# Patient Record
Sex: MALE | Race: WHITE | NOT HISPANIC OR LATINO | Employment: OTHER | URBAN - METROPOLITAN AREA
[De-identification: names, ages, dates, MRNs, and addresses within clinical notes are randomized per-mention and may not be internally consistent; named-entity substitution may affect disease eponyms.]

---

## 2017-11-02 ENCOUNTER — TRANSCRIBE ORDERS (OUTPATIENT)
Dept: URGENT CARE | Facility: CLINIC | Age: 77
End: 2017-11-02

## 2017-11-02 ENCOUNTER — APPOINTMENT (OUTPATIENT)
Dept: RADIOLOGY | Facility: CLINIC | Age: 77
End: 2017-11-02
Payer: MEDICARE

## 2017-11-02 ENCOUNTER — OFFICE VISIT (OUTPATIENT)
Dept: URGENT CARE | Facility: CLINIC | Age: 77
End: 2017-11-02
Payer: MEDICARE

## 2017-11-02 DIAGNOSIS — M54.50 LOW BACK PAIN: ICD-10-CM

## 2017-11-02 PROCEDURE — 72110 X-RAY EXAM L-2 SPINE 4/>VWS: CPT

## 2017-11-02 PROCEDURE — G0463 HOSPITAL OUTPT CLINIC VISIT: HCPCS

## 2017-11-02 PROCEDURE — 99203 OFFICE O/P NEW LOW 30 MIN: CPT

## 2017-11-02 PROCEDURE — 73522 X-RAY EXAM HIPS BI 3-4 VIEWS: CPT

## 2017-11-04 NOTE — PROGRESS NOTES
Assessment  1  Low back pain (724 2) (M54 5)   2  Lumbar vertebral fracture (805 4) (S32 009A)    Plan  Low back pain    · * XR SPINE LUMBAR MINIMUM 4 VIEWS NON INJURY; Status:Complete - Retrospective  Authorization;   Done: [id number] 05:49PM   · XR HIPS BILATERAL 3-4 VS W PELVIS IF PERFORMED; Status:Complete - Retrospective  Authorization;   Done: [id number] 05:49PM    Discussion/Summary  Discussion Summary:   Follow up with orthopedistoxycodone 5mg for painice 10-20 minutes at a time at least 3 times a dayup with cardiologist regarding AAA  Chief Complaint  1  Pain  Chief Complaint Free Text Note Form: Pain in pelvic area for months      History of Present Illness  HPI: 77yo M p/w pain in lower back x several months  Pt suffered stress fractures to anterior pelvis in August and is followed by orthopedic surgeon  Pt was given injection by [facility] Interventional Radiologist Dr Araceli Rivera of [place] and methylprednisolone into right SI joint on Monday  Pt is complaining of worsening pain since this injection  Pt does not have follow up scheduled with radiologist or orthopedist  I spoke with Radiologist on the phone and radiologist sent pt here for repeat xrays of pelvis and lower back to r/o repeat fractures  Hospital Based Practices Required Assessment:   Pain Assessment   the patient states they have pain  The pain is located in the pelvis  The patient describes the pain as aching  (on a scale of 0 to 10, the patient rates the pain at 8 )       Review of Systems  Focused-Male:   Constitutional: no fever or chills, feels well, no tiredness, no recent weight loss or weight gain  ENT: no complaints of earache, no loss of hearing, no nosebleeds or nasal discharge, no sore throat or hoarseness  Cardiovascular: no complaints of slow or fast heart rate, no chest pain, no palpitations, no leg claudication or lower extremity edema     Respiratory: no complaints of shortness of breath, no wheezing or cough, no dyspnea on exertion, no orthopnea or PND  Gastrointestinal: no complaints of abdominal pain, no constipation, no nausea or vomiting, no diarrhea or bloody stools  Genitourinary: no complaints of dysuria or incontinence, no hesitancy, no nocturia, no genital lesion, no inadequacy of penile erection  Musculoskeletal: arthralgias-- and-- myalgias, but-- no joint swelling,-- no limb pain,-- no joint stiffness-- and-- no limb swelling  Integumentary: no complaints of skin rash or lesion, no itching or dry skin, no skin wounds  Neurological: no complaints of headache, no confusion, no numbness or tingling, no dizziness or fainting  ROS Reviewed:   ROS reviewed  Past Medical History  Active Problems And Past Medical History Reviewed: The active problems and past medical history were reviewed and updated today  Family History  Family History Reviewed: The family history was reviewed and updated today  Social History  Social History Reviewed: The social history was reviewed and is unchanged  Surgical History  Surgical History Reviewed: The surgical history was reviewed and updated today  Current Meds  Medication List Reviewed: The medication list was reviewed and updated today  Vitals  Signs   Recorded: [id number] 05:37PM   Temperature: 98 6 F  Heart Rate: 88  Respiration: 20  Systolic: 872  Diastolic: 75  Height: 5 ft 10 in  Weight: 275 lb   BMI Calculated: 39 46  BSA Calculated: 2 39  O2 Saturation: 96    Physical Exam    Constitutional   General appearance: No acute distress, well appearing and well nourished  Pulmonary   Respiratory effort: No increased work of breathing or signs of respiratory distress  Auscultation of lungs: Clear to auscultation  Cardiovascular   Palpation of heart: Normal PMI, no thrills  Auscultation of heart: Normal rate and rhythm, normal S1 and S2, without murmurs      Examination of extremities for edema and/or varicosities: Normal  Abdomen   Abdomen: Non-tender, no masses  Liver and spleen: No hepatomegaly or splenomegaly  Lymphatic   Palpation of lymph nodes in neck: No lymphadenopathy  Musculoskeletal   Gait and station: Normal     Digits and nails: Normal without clubbing or cyanosis  Inspection/palpation of joints, bones, and muscles: Abnormal  -- TTP over R SI joint and lower lumbar spine  Skin   Skin and subcutaneous tissue: Normal without rashes or lesions  Neurologic   Cranial nerves: Cranial nerves 2-12 intact  Reflexes: 2+ and symmetric  Sensation: No sensory loss  Psychiatric   Orientation to person, place and time: Normal     Mood and affect: Normal        Results/Data  * XR SPINE LUMBAR MINIMUM 4 VIEWS NON INJURY [id number] 05:49PM [facility] Order Number: [id number]     Test Name Result Flag Reference   XR SPINE LUMBAR MINIMUM 4 VIEWS (Report)     LUMBAR SPINE     INDICATION: M54 5: Low back pain  History taken directly from the electronic ordering system  COMPARISON: None     VIEWS: AP, lateral, bilateral oblique and coned down projections     IMAGES: 5     FINDINGS:     Alignment is unremarkable  Mild to moderate anterior wedging is noted of the L1 vertebral body of indeterminate age  There is evidence of prior kyphoplasty to the L2 vertebral body where there is mild vertebral body height loss  Vertebral body height is otherwise maintained  The bones are demineralized  There is moderate disc space narrowing at L1-L2 and L5-S1  There is otherwise mild disc space narrowing in the lumbar spine with scattered mild to moderate marginal osteophyte formation  Dense aortic wall calcification is noted  Aortic wall calcification is distended distally suggestive of underlying abdominal aortic aneurysm measuring up to 4 5 cm in diameter  IMPRESSION:     1  Mild to moderate anterior wedging is noted of the L1 vertebral body of indeterminate age   This could be further evaluated with MRI  There is evidence of prior kyphoplasty to the L2 vertebral body where there is mild vertebral body height loss  2  Evidence for an underlying abdominal aortic aneurysm measuring up to 4 5 cm in diameter  Recommend follow-up with CT or ultrasound  ##imslh##imslh            Workstation performed: [id number]     Signed by:   Boy Singleton MD   11/2/17     XR HIPS BILATERAL 3-4 VS W PELVIS IF PERFORMED [id number] 05:49PM [facility] Order Number: [id number]     Test Name Result Flag Reference   XR HIPS BILATERAL WITH AP PELVIS 3-4 VW (Report)     BILATERAL HIPS AND PELVIS     INDICATION: Pain  COMPARISON: None     VIEWS: AP pelvis and coned down views of each hip     IMAGES: 5      FINDINGS:     No acute fracture  Subcortical cyst noted at the right ischial tuberosity may be degenerative  Visualized bony pelvis appears intact  LEFT HIP:   Moderate narrowing noted of the hip joint  Bony alignment is maintained  Soft tissues are unremarkable  RIGHT HIP:   Moderate narrowing noted of the hip joint  Bony alignment is maintained  Soft tissues are unremarkable  IMPRESSION:     No acute fracture         Workstation performed: [id number]     Signed by:   Boy Singleton MD   11/2/17       Signatures   Electronically signed by : HADLEY Collins; Nov  3 2017  9:20AM EST                       (Author)    Electronically signed by : HADLEY Collnis; Nov  3 2017  9:21AM EST                       (Author)    Electronically signed by : NETTE Basurto ; Nov  3 2017 10:53AM EST                       (Co-author)

## 2020-08-31 ENCOUNTER — CONSULT (OUTPATIENT)
Dept: PAIN MEDICINE | Facility: CLINIC | Age: 80
End: 2020-08-31
Payer: MEDICARE

## 2020-08-31 VITALS
HEIGHT: 71 IN | DIASTOLIC BLOOD PRESSURE: 76 MMHG | SYSTOLIC BLOOD PRESSURE: 128 MMHG | WEIGHT: 250 LBS | TEMPERATURE: 96.9 F | HEART RATE: 75 BPM | BODY MASS INDEX: 35 KG/M2

## 2020-08-31 DIAGNOSIS — M16.0 BILATERAL PRIMARY OSTEOARTHRITIS OF HIP: ICD-10-CM

## 2020-08-31 DIAGNOSIS — M54.16 LUMBAR RADICULOPATHY: Primary | ICD-10-CM

## 2020-08-31 PROCEDURE — 99204 OFFICE O/P NEW MOD 45 MIN: CPT | Performed by: ANESTHESIOLOGY

## 2020-08-31 RX ORDER — CHOLECALCIFEROL (VITAMIN D3) 25 MCG
CAPSULE ORAL
COMMUNITY

## 2020-08-31 RX ORDER — MONTELUKAST SODIUM 10 MG/1
10 TABLET ORAL DAILY
COMMUNITY
Start: 2020-07-03

## 2020-08-31 RX ORDER — SITAGLIPTIN 100 MG/1
TABLET, FILM COATED ORAL
COMMUNITY
Start: 2020-08-10

## 2020-08-31 RX ORDER — ATORVASTATIN CALCIUM 20 MG/1
TABLET, FILM COATED ORAL
COMMUNITY
Start: 2020-06-18

## 2020-08-31 RX ORDER — TAMSULOSIN HCL 0.4 MG
CAPSULE ORAL
COMMUNITY
Start: 2020-06-18

## 2020-08-31 RX ORDER — UMECLIDINIUM BROMIDE AND VILANTEROL TRIFENATATE 62.5; 25 UG/1; UG/1
POWDER RESPIRATORY (INHALATION)
COMMUNITY
Start: 2020-07-05

## 2020-08-31 RX ORDER — ASPIRIN 81 MG/1
TABLET ORAL
COMMUNITY

## 2020-08-31 RX ORDER — OXYBUTYNIN CHLORIDE 5 MG/1
TABLET ORAL
COMMUNITY
Start: 2020-08-10

## 2020-08-31 RX ORDER — ALBUTEROL SULFATE 90 UG/1
2 AEROSOL, METERED RESPIRATORY (INHALATION) EVERY 6 HOURS PRN
COMMUNITY

## 2020-08-31 RX ORDER — VITAMIN B COMPLEX
TABLET ORAL
COMMUNITY

## 2020-08-31 RX ORDER — LEVOTHYROXINE SODIUM 175 MCG
TABLET ORAL
COMMUNITY
Start: 2020-06-18

## 2020-08-31 RX ORDER — LANCETS 28 GAUGE
EACH MISCELLANEOUS
COMMUNITY

## 2020-08-31 RX ORDER — POLYETHYLENE GLYCOL 3350
GRANULES (GRAM) MISCELLANEOUS
COMMUNITY

## 2020-08-31 RX ORDER — CARVEDILOL 3.12 MG/1
TABLET ORAL
COMMUNITY
Start: 2019-12-19 | End: 2020-12-18

## 2020-08-31 RX ORDER — GLIMEPIRIDE 1 MG/1
TABLET ORAL
COMMUNITY
Start: 2020-08-10

## 2020-08-31 RX ORDER — GABAPENTIN 100 MG/1
100 CAPSULE ORAL 3 TIMES DAILY
Qty: 90 CAPSULE | Refills: 0 | Status: SHIPPED | OUTPATIENT
Start: 2020-08-31

## 2020-08-31 NOTE — PATIENT INSTRUCTIONS
Lumbar Radiculopathy   WHAT YOU NEED TO KNOW:   What is lumbar radiculopathy? Lumbar radiculopathy is a painful condition that happens when a nerve in your lumbar spine (lower back) is pinched or irritated  Nerves control feeling and movement in your body  What causes lumbar radiculopathy? You may get a pinched nerve in your lumbar spine if you have disc damage  Discs are natural, spongy cushions between your vertebrae (back bones) that allow your spine to move  Your discs may move out of place and pinch the nerve in your spine  Your risk for a pinched nerve and lumbar radiculopathy increases if:  · You smoke  · You have diabetes, a spinal infection, or a growth in your spine  · You are overweight  · You are male  · You are elderly  What are the signs and symptoms of lumbar radiculopathy? You may have any of the following:  · Pain that moves from your lower back to your buttocks, groin, and the back of your leg  The pain is often felt below your knee  Your pain may worsen when you cough, sneeze, stand, or sit  · Numbness, weakness, or tingling in your back or legs  How is lumbar radiculopathy diagnosed? Your healthcare provider will examine you and ask about your family history of back and leg pain  He may also test you for weakness, numbness, or tingling in your back, buttocks, and legs  Your healthcare provider may ask you to lie on your back and lift your leg to locate your pain  You may have any of the following:  · Blood tests: You may need blood taken to give caregivers information about how your body is working  The blood may be taken from your hand, arm, or IV  · Magnetic resonance imaging (MRI): An MRI machine is used to take a picture of your lower back  Your healthcare provider will use this picture to check for problems and changes in your back bones, nerves, and discs  You will need to lie still during this test  The MRI machine contains a very powerful magnet    Never enter the MRI room with any metal objects  This can cause serious injury  Tell your healthcare provider if you have any metal implants in your body  · X-ray: An x-ray is a picture of your lower back  A lumbar x-ray may show signs of infection or other problems with your spine  · An electromyography (EMG)  test measures the electrical activity of your muscles at rest and with movement  · Computed tomography (CT) scan: A special x-ray machine uses a computer to take pictures of your lower back  It may be used to look at your bones, discs, and nerves  You may be given dye in your IV to help improve the pictures  Tell your healthcare provider if you are allergic to shellfish, or have other allergies or medical conditions  People who are allergic to iodine or shellfish (lobster, crab, or shrimp) may be allergic to some dyes  How is lumbar radiculopathy treated? Treatment of lumbar radiculopathy may reduce pain and swelling, and improve your ability to walk and do your normal activities  Ask your healthcare provider for more information about these and other treatments for lumbar radiculopathy:  · Medicines:     ¨ NSAIDs , such as ibuprofen, help decrease swelling, pain, and fever  This medicine is available with or without a doctor's order  NSAIDs can cause stomach bleeding or kidney problems in certain people  If you take blood thinner medicine, always ask your healthcare provider if NSAIDs are safe for you  Always read the medicine label and follow directions  ¨ Muscle relaxers  help decrease pain and muscle spasms  ¨ Opioids: This is a strong medicine given to reduce severe pain  It is also called narcotic pain medicine  Take this medicine exactly as directed by your healthcare provider  ¨ Oral steroids: Steroids may be given to reduce swelling and pain  ¨ Steroid injections: Healthcare providers may give you steroid medicine through a needle (shot) into your lumbar spine   This may help decrease your nerve pain and swelling  You may need more than 1 injection if your symptoms do not improve after the first treatment  · Physical therapy: Your healthcare provider may suggest physical therapy  Your physical therapist may teach you certain exercises to improve posture (the way you stand and sit), flexibility, and strength in your lower back  He may also teach you how to remain safely active and avoid further injury  Follow the exercise plan given to you by your physical therapist     · Transcutaneous electrical nerve stimulation: This treatment, called TENS, stimulates your nerves and may decrease your pain  Wires are attached to pads  The pads are attached to your skin  The wires send a mild current through your nerves  · Surgery: You may need surgery to relieve your pinched nerve if your condition has not improved within 4 to 6 weeks  You may also need it if you have lumbar radiculopathy more than once  What are the risks of treatment for lumbar radiculopathy? · Without treatment, your pain may worsen  The pinched and swollen nerve may lead to problems when you walk or move  In severe cases, you may lose control of your urine or bowel movements  Bedrest can make your symptoms worse  · Pain medicines can cause vomiting, upset stomach, constipation (large, hard bowel movements that are difficult to pass), or kidney or liver problems  Opioid medicine may be addictive (hard to quit taking once you start)  Oral steroids and steroid injections may have side effects, such as facial redness, fluid retention (water weight gain), and mood changes  Steroid injections may be painful and can cause severe headaches, infections, allergic reactions, or nerve damage  Surgery risks include delayed problems with healing, spinal or bladder infection, damage to the spinal cord or other nerves, and ongoing pain  In rare cases, you could become paralyzed (not able to move your arms or legs)    How can I care for myself when I have lumbar radiculopathy? · Stay active: It is best to be active when you have lumbar radiculopathy  Your healthcare provider may tell you to take walks to ease yourself back into your daily routine  Avoid long periods of bed rest  Bed rest could worsen your symptoms  Do not move in ways that increase your pain  Ask your healthcare provider for more information about the best ways to stay active  · Use ice or heat packs:  Use ice or heat packs on the sore area of your body to decrease the pain and swelling  Put ice in a plastic bag covered with a towel on your low back  Cover heated items with a towel to avoid burns  Use ice and heat as directed  · Avoid heavy lifting: Your condition may worsen if you lift heavy things  Avoid lifting if possible  · Maintain a healthy weight:  Excess body weight may strain your back  Talk with your healthcare provider about ways to lose excess weight if you are overweight  When should I contact my healthcare provider? · Your pain does not improve within 1 to 3 weeks after treatment  · Your pain and weakness keep you from your normal activities at work, home, or school  · You lose more than 10 pounds in 6 months without trying  · You become depressed or sad because of the pain  · You have questions or concerns about your condition or care  When should I seek immediate care or call 911? · You have a fever greater than 100 4°F for longer than 2 days  · You have new, severe back or leg pain, or your pain spreads to both legs  · You have any new signs of numbness or weakness, especially in your lower back, legs, arms, or genital area  · You have new trouble controlling your urine and bowel movements  · You do not feel like your bladder empties when you urinate  CARE AGREEMENT:   You have the right to help plan your care  Learn about your health condition and how it may be treated   Discuss treatment options with your caregivers to decide what care you want to receive  You always have the right to refuse treatment  The above information is an  only  It is not intended as medical advice for individual conditions or treatments  Talk to your doctor, nurse or pharmacist before following any medical regimen to see if it is safe and effective for you  © 2017 [employer] Information is for End User's use only and may not be sold, redistributed or otherwise used for commercial purposes  All illustrations and images included in CareNotes® are the copyrighted property of [employer]  or [employer]

## 2020-08-31 NOTE — PROGRESS NOTES
Assessment  1  Lumbar radiculopathy  -     MRI lumbar spine wo contrast; Future; Expected date: 08/31/2020  -     Ambulatory referral to Physical Therapy; Future  -     gabapentin (NEURONTIN) 100 mg capsule; Take 1 capsule (100 mg total) by mouth 3 (three) times a day    2  Bilateral primary osteoarthritis of hip  -     Ambulatory referral to Physical Therapy; Future    Symptomatology of neurogenic claudication  Chronic arthritic like low back pain with radicular features in L5 and S1 dermatomal distribution bilaterally  Pain has been progressive accompanied by weakness numbness and paresthesias  X-ray lumbar spine shows degenerative disc disease along with facet arthropathy at lower lumbar levels  Patient also has pain with lumbar facet loading as well  The pain is severely debilitating as he can hardly walk 30-40 feet before having the extreme pain  TTP over trochanteric bursa R>L and pain with internal and external rotation of hips b/l  Plan    -MRI lumbar spine noncontrast  -gabapentin 100 mg t i d  prn neuropathic pain  -PT referral for lumbar radiculopathy  [place] Prescription Drug Monitoring Program report was reviewed and was appropriate     Complete risks and benefits including bleeding, infection, tissue reaction, nerve injury and allergic reaction were discussed  The approach was demonstrated using models and literature was provided  Verbal consent was obtained  My impressions and treatment recommendations were discussed in detail with the patient who verbalized understanding and had no further questions  Discharge instructions were provided  I personally saw and examined the patient and I agree with the above discussed plan of care      New Medications Ordered This Visit   Medications    aspirin (Aspirin Adult Low Dose) 81 mg EC tablet     Sig: Take by mouth    atorvastatin (LIPITOR) 20 mg tablet    carvedilol (COREG) 3 125 mg tablet     Sig: TAKE ONE TABLET BY MOUTH TWICE A DAY WITH MORNING AND EVENING MEALS    Coenzyme Q10 (CoQ10) 100 MG CAPS     Sig: Take by mouth    denosumab (PROLIA) 60 mg/mL     Sig: Inject under the skin    glucose blood test strip     Sig: FreeStyle Lite Strips    Lancets (freestyle) lancets     Sig: FreeStyle Lancets 28 gauge    glimepiride (AMARYL) 1 mg tablet    Synthroid 175 MCG tablet    montelukast (SINGULAIR) 10 mg tablet     Sig: Take 10 mg by mouth daily    oxybutynin (DITROPAN) 5 mg tablet    Calcium Carbonate-Vitamin D (Calcium 600+D) 600-400 MG-UNIT per tablet    Cholecalciferol (Vitamin D-3) 25 MCG (1000 UT) CAPS     Sig: Take by mouth    sertraline (ZOLOFT) 50 mg tablet    Januvia 100 MG tablet    Flomax 0 4 MG    Anoro Ellipta 62 5-25 MCG/INH inhaler    Polyethylene Glycol 3350 GRAN     Sig: by Does not apply route    albuterol (ProAir HFA) 90 mcg/act inhaler     Sig: Inhale 2 puffs every 6 (six) hours as needed for wheezing     Substitution to a formulary equivalent within the same pharmaceutical class is authorized   L-Methylfolate-B6-B12 (FOLTX PO)     Sig: Take by mouth    gabapentin (NEURONTIN) 100 mg capsule     Sig: Take 1 capsule (100 mg total) by mouth 3 (three) times a day     Dispense:  90 capsule     Refill:  0       History of Present Illness    Pratik Rudolph is a 68 y o  male with pmhx of ESRD and DM-2, obesity presenting with chronic lumbar radicular pain in an L5 and S1 dermatomal distributions bilaterally  The patient rates the pain at a 8/10 accompanied by electric shock-like shooting features and crampy burning pain in the aforementioned dermatomal distributions  The pain is worse in the mornings as well as the end of the day; exertion such as walking for long periods of time seems to exacerbate the pain  The patient cannot hardly walk more than a few blocks without having debilitating pain  He tries to maintain an active lifestyle and finds that the current degree of pain seems to compromises this   He has arthritic features of the pain too described as achy, nagging, indolent, stabbing and crampy  The pain significantly impacts independent activities of daily living and contributes to significant disability  He has attempted PT in the past with exacerbation of the pain  He has not tried gabapentin, but nsaids are a relative contraindication secondary to renal insufficiency  He has tried epidural steroid injections in the past with benefit for about a month  He denies any bowel or bladder dysfunction/incontinence    I have personally reviewed and/or updated the patient's past medical history, past surgical history, family history, social history, current medications, allergies, and vital signs today  Review of Systems   Constitutional: Positive for activity change  HENT: Negative  Eyes: Negative  Respiratory: Negative  Cardiovascular: Negative  Gastrointestinal: Negative  Endocrine: Negative  Genitourinary: Negative  Musculoskeletal: Positive for arthralgias, back pain, gait problem and myalgias  Skin: Negative  Allergic/Immunologic: Negative  Neurological: Positive for weakness and numbness  Hematological: Negative  Psychiatric/Behavioral: Negative  All other systems reviewed and are negative  There is no problem list on file for this patient        Past Medical History:   Diagnosis Date    [facility]     COPD [facility]chronic obstructive pulmonary disease) [facility]     Diabetes (HCC)     Dyslipidemia     MARY (generalized anxiety disorder)     Osteoporosis        Past Surgical History:   Procedure Laterality Date    BACK SURGERY  07/2009    FRACTURE SURGERY      stress fracture of pelvis    LEG SURGERY         Family History   Problem Relation Age of Onset    Diabetes Mother     Osteoporosis Mother     Heart disease Father        Social History     Occupational History    Not on file   Tobacco Use    Smoking status: Never Smoker    Smokeless tobacco: Never Used   Substance and Sexual Activity    Alcohol use: Yes     Comment: rarely    Drug use: Never    Sexual activity: Not on file       Current Outpatient Medications on File Prior to Visit   Medication Sig    albuterol (ProAir HFA) 90 mcg/act inhaler Inhale 2 puffs every 6 (six) hours as needed for wheezing    Anoro Ellipta 62 5-25 MCG/INH inhaler     aspirin (Aspirin Adult Low Dose) 81 mg EC tablet Take by mouth    atorvastatin (LIPITOR) 20 mg tablet     Calcium Carbonate-Vitamin D (Calcium 600+D) 600-400 MG-UNIT per tablet     carvedilol (COREG) 3 125 mg tablet TAKE ONE TABLET BY MOUTH TWICE A DAY WITH MORNING AND EVENING MEALS    Cholecalciferol (Vitamin D-3) 25 MCG (1000 UT) CAPS Take by mouth    Coenzyme Q10 (CoQ10) 100 MG CAPS Take by mouth    denosumab (PROLIA) 60 mg/mL Inject under the skin    Flomax 0 4 MG     glimepiride (AMARYL) 1 mg tablet     glucose blood test strip FreeStyle Lite Strips    Januvia 100 MG tablet     L-Methylfolate-B6-B12 (FOLTX PO) Take by mouth    Lancets (freestyle) lancets FreeStyle Lancets 28 gauge    montelukast (SINGULAIR) 10 mg tablet Take 10 mg by mouth daily    oxybutynin (DITROPAN) 5 mg tablet     Polyethylene Glycol 3350 GRAN by Does not apply route    sertraline (ZOLOFT) 50 mg tablet     Synthroid 175 MCG tablet      No current facility-administered medications on file prior to visit  Allergies   Allergen Reactions    Benazepril      cough  cough      Ezetimibe-Simvastatin Myalgia      cramps   cramps      Metformin       diarrhea   diarrhea      Metoprolol Headache     headache  headache      Nsaids      Low GFR    Tolmetin      Other reaction(s): Other (See Comments)  Low GFR    Zolpidem      Other reaction(s):  Other (See Comments)  hallucinating  hallucinating      Mirtazapine Anxiety     Anxious, confusion  Anxious, confusion      Oxycodone Anxiety     agitation         Physical Exam    /76 (BP Location: Left arm, Patient Position: Sitting, Cuff Size: Adult)   Pulse 75   Temp (!) 96 9 °F (36 1 °C) (Temporal)   Ht 5' 11" (1 803 m)   Wt 113 kg (250 lb)   BMI 34 87 kg/m²     Constitutional: normal, well developed, well nourished, alert, in no distress and non-toxic and no overt pain behavior  and obese  Eyes: anicteric  HEENT: grossly intact  Neck: supple, symmetric, trachea midline and no masses   Pulmonary:even and unlabored  Cardiovascular:No edema or pitting edema present  Skin:Normal without rashes or lesions and well hydrated  Psychiatric:Mood and affect appropriate  Neurologic:Cranial Nerves II-XII grossly intact Sensation grossly intact; no clonus negative espinosa's  Reflexes 2+ and brisk  SLR positive left>right sided  Musculoskeletal:normal gait  4/5 strength with left hip flexion and left ankle dorsiflexion  Normal heel toe and tip toe walking  Notable pain with lumbar facet loading bilaterally and with lateral spine rotation  ttp over lumbar paraspinal muscles  Negative brionna's test, negative gaenslen's negative SIJ loading bilaterally  ttp over right trochanteric bursa and internal and external rotation of right hip  Imaging    BILATERAL HIPS AND PELVIS     INDICATION:  Pain      COMPARISON:  None     VIEWS: AP pelvis and coned down views of each hip     IMAGES:  5     FINDINGS:     No acute fracture  Subcortical cyst noted at the right ischial tuberosity may be degenerative  Visualized bony pelvis appears intact      LEFT HIP:  Moderate narrowing noted of the hip joint  Bony alignment is maintained  Soft tissues are unremarkable      RIGHT HIP:  Moderate narrowing noted of the hip joint  Bony alignment is maintained  Soft tissues are unremarkable      IMPRESSION:     No acute fracture  LUMBAR SPINE     INDICATION:  M54 5: Low back pain   History taken directly from the electronic ordering system           COMPARISON: None     VIEWS:  AP, lateral, bilateral oblique and coned down projections     IMAGES:  5     FINDINGS:     Alignment is unremarkable      Mild to moderate anterior wedging is noted of the L1 vertebral body of indeterminate age  There is evidence of prior kyphoplasty to the L2 vertebral body where there is mild vertebral body height loss  Vertebral body height is otherwise maintained  The bones are demineralized      There is moderate disc space narrowing at L1-L2 and L5-S1  There is otherwise mild disc space narrowing in the lumbar spine with scattered mild to moderate marginal osteophyte formation      Dense aortic wall calcification is noted  Aortic wall calcification is distended distally suggestive of underlying abdominal aortic aneurysm measuring up to 4 5 cm in diameter      IMPRESSION:     1  Mild to moderate anterior wedging is noted of the L1 vertebral body of indeterminate age  This could be further evaluated with MRI  There is evidence of prior kyphoplasty to the L2 vertebral body where there is mild vertebral body height loss  2   Evidence for an underlying abdominal aortic aneurysm measuring up to 4 5 cm in diameter    Recommend follow-up with CT or ultrasound

## 2020-08-31 NOTE — H&P (VIEW-ONLY)
Assessment  1  Lumbar radiculopathy  -     MRI lumbar spine wo contrast; Future; Expected date: 08/31/2020  -     Ambulatory referral to Physical Therapy; Future  -     gabapentin (NEURONTIN) 100 mg capsule; Take 1 capsule (100 mg total) by mouth 3 (three) times a day    2  Bilateral primary osteoarthritis of hip  -     Ambulatory referral to Physical Therapy; Future    Symptomatology of neurogenic claudication  Chronic arthritic like low back pain with radicular features in L5 and S1 dermatomal distribution bilaterally  Pain has been progressive accompanied by weakness numbness and paresthesias  X-ray lumbar spine shows degenerative disc disease along with facet arthropathy at lower lumbar levels  Patient also has pain with lumbar facet loading as well  The pain is severely debilitating as he can hardly walk 30-40 feet before having the extreme pain  TTP over trochanteric bursa R>L and pain with internal and external rotation of hips b/l  Plan    -MRI lumbar spine noncontrast  -gabapentin 100 mg t i d  prn neuropathic pain  -PT referral for lumbar radiculopathy  [place] Prescription Drug Monitoring Program report was reviewed and was appropriate     Complete risks and benefits including bleeding, infection, tissue reaction, nerve injury and allergic reaction were discussed  The approach was demonstrated using models and literature was provided  Verbal consent was obtained  My impressions and treatment recommendations were discussed in detail with the patient who verbalized understanding and had no further questions  Discharge instructions were provided  I personally saw and examined the patient and I agree with the above discussed plan of care      New Medications Ordered This Visit   Medications    aspirin (Aspirin Adult Low Dose) 81 mg EC tablet     Sig: Take by mouth    atorvastatin (LIPITOR) 20 mg tablet    carvedilol (COREG) 3 125 mg tablet     Sig: TAKE ONE TABLET BY MOUTH TWICE A DAY WITH MORNING AND EVENING MEALS    Coenzyme Q10 (CoQ10) 100 MG CAPS     Sig: Take by mouth    denosumab (PROLIA) 60 mg/mL     Sig: Inject under the skin    glucose blood test strip     Sig: FreeStyle Lite Strips    Lancets (freestyle) lancets     Sig: FreeStyle Lancets 28 gauge    glimepiride (AMARYL) 1 mg tablet    Synthroid 175 MCG tablet    montelukast (SINGULAIR) 10 mg tablet     Sig: Take 10 mg by mouth daily    oxybutynin (DITROPAN) 5 mg tablet    Calcium Carbonate-Vitamin D (Calcium 600+D) 600-400 MG-UNIT per tablet    Cholecalciferol (Vitamin D-3) 25 MCG (1000 UT) CAPS     Sig: Take by mouth    sertraline (ZOLOFT) 50 mg tablet    Januvia 100 MG tablet    Flomax 0 4 MG    Anoro Ellipta 62 5-25 MCG/INH inhaler    Polyethylene Glycol 3350 GRAN     Sig: by Does not apply route    albuterol (ProAir HFA) 90 mcg/act inhaler     Sig: Inhale 2 puffs every 6 (six) hours as needed for wheezing     Substitution to a formulary equivalent within the same pharmaceutical class is authorized   L-Methylfolate-B6-B12 (FOLTX PO)     Sig: Take by mouth    gabapentin (NEURONTIN) 100 mg capsule     Sig: Take 1 capsule (100 mg total) by mouth 3 (three) times a day     Dispense:  90 capsule     Refill:  0       History of Present Illness    Shantal Barrientos is a 68 y o  male with pmhx of ESRD and DM-2, obesity presenting with chronic lumbar radicular pain in an L5 and S1 dermatomal distributions bilaterally  The patient rates the pain at a 8/10 accompanied by electric shock-like shooting features and crampy burning pain in the aforementioned dermatomal distributions  The pain is worse in the mornings as well as the end of the day; exertion such as walking for long periods of time seems to exacerbate the pain  The patient cannot hardly walk more than a few blocks without having debilitating pain  He tries to maintain an active lifestyle and finds that the current degree of pain seems to compromises this   He has arthritic features of the pain too described as achy, nagging, indolent, stabbing and crampy  The pain significantly impacts independent activities of daily living and contributes to significant disability  He has attempted PT in the past with exacerbation of the pain  He has not tried gabapentin, but nsaids are a relative contraindication secondary to renal insufficiency  He has tried epidural steroid injections in the past with benefit for about a month  He denies any bowel or bladder dysfunction/incontinence    I have personally reviewed and/or updated the patient's past medical history, past surgical history, family history, social history, current medications, allergies, and vital signs today  Review of Systems   Constitutional: Positive for activity change  HENT: Negative  Eyes: Negative  Respiratory: Negative  Cardiovascular: Negative  Gastrointestinal: Negative  Endocrine: Negative  Genitourinary: Negative  Musculoskeletal: Positive for arthralgias, back pain, gait problem and myalgias  Skin: Negative  Allergic/Immunologic: Negative  Neurological: Positive for weakness and numbness  Hematological: Negative  Psychiatric/Behavioral: Negative  All other systems reviewed and are negative  There is no problem list on file for this patient        Past Medical History:   Diagnosis Date    [facility]     [facility]ch[facility] obstructive [facility] disease[facility]     Diabetes (HCC)     Dyslipidemia     MARY (generalized anxiety disorder)     Osteoporosis        Past Surgical History:   Procedure Laterality Date    BACK SURGERY  07/2009    FRACTURE SURGERY      stress fracture of pelvis    LEG SURGERY         Family History   Problem Relation Age of Onset    Diabetes Mother     Osteoporosis Mother     Heart disease Father        Social History     Occupational History    Not on file   Tobacco Use    Smoking status: Never Smoker    Smokeless tobacco: Never Used   Substance and Sexual Activity    Alcohol use: Yes     Comment: rarely    Drug use: Never    Sexual activity: Not on file       Current Outpatient Medications on File Prior to Visit   Medication Sig    albuterol (ProAir HFA) 90 mcg/act inhaler Inhale 2 puffs every 6 (six) hours as needed for wheezing    Anoro Ellipta 62 5-25 MCG/INH inhaler     aspirin (Aspirin Adult Low Dose) 81 mg EC tablet Take by mouth    atorvastatin (LIPITOR) 20 mg tablet     Calcium Carbonate-Vitamin D (Calcium 600+D) 600-400 MG-UNIT per tablet     carvedilol (COREG) 3 125 mg tablet TAKE ONE TABLET BY MOUTH TWICE A DAY WITH MORNING AND EVENING MEALS    Cholecalciferol (Vitamin D-3) 25 MCG (1000 UT) CAPS Take by mouth    Coenzyme Q10 (CoQ10) 100 MG CAPS Take by mouth    denosumab (PROLIA) 60 mg/mL Inject under the skin    Flomax 0 4 MG     glimepiride (AMARYL) 1 mg tablet     glucose blood test strip FreeStyle Lite Strips    Januvia 100 MG tablet     L-Methylfolate-B6-B12 (FOLTX PO) Take by mouth    Lancets (freestyle) lancets FreeStyle Lancets 28 gauge    montelukast (SINGULAIR) 10 mg tablet Take 10 mg by mouth daily    oxybutynin (DITROPAN) 5 mg tablet     Polyethylene Glycol 3350 GRAN by Does not apply route    sertraline (ZOLOFT) 50 mg tablet     Synthroid 175 MCG tablet      No current facility-administered medications on file prior to visit  Allergies   Allergen Reactions    Benazepril      cough  cough      Ezetimibe-Simvastatin Myalgia      cramps   cramps      Metformin       diarrhea   diarrhea      Metoprolol Headache     headache  headache      Nsaids      Low GFR    Tolmetin      Other reaction(s): Other (See Comments)  Low GFR    Zolpidem      Other reaction(s):  Other (See Comments)  hallucinating  hallucinating      Mirtazapine Anxiety     Anxious, confusion  Anxious, confusion      Oxycodone Anxiety     agitation         Physical Exam    /76 (BP Location: Left arm, Patient Position: Sitting, Cuff Size: Adult)   Pulse 75   Temp (!) 96 9 °F (36 1 °C) (Temporal)   Ht 5' 11" (1 803 m)   Wt 113 kg (250 lb)   BMI 34 87 kg/m²     Constitutional: normal, well developed, well nourished, alert, in no distress and non-toxic and no overt pain behavior  and obese  Eyes: anicteric  HEENT: grossly intact  Neck: supple, symmetric, trachea midline and no masses   Pulmonary:even and unlabored  Cardiovascular:No edema or pitting edema present  Skin:Normal without rashes or lesions and well hydrated  Psychiatric:Mood and affect appropriate  Neurologic:Cranial Nerves II-XII grossly intact Sensation grossly intact; no clonus negative espinosa's  Reflexes 2+ and brisk  SLR positive left>right sided  Musculoskeletal:normal gait  4/5 strength with left hip flexion and left ankle dorsiflexion  Normal heel toe and tip toe walking  Notable pain with lumbar facet loading bilaterally and with lateral spine rotation  ttp over lumbar paraspinal muscles  Negative brionna's test, negative gaenslen's negative SIJ loading bilaterally  ttp over right trochanteric bursa and internal and external rotation of right hip  Imaging    BILATERAL HIPS AND PELVIS     INDICATION:  Pain      COMPARISON:  None     VIEWS: AP pelvis and coned down views of each hip     IMAGES:  5     FINDINGS:     No acute fracture  Subcortical cyst noted at the right ischial tuberosity may be degenerative  Visualized bony pelvis appears intact      LEFT HIP:  Moderate narrowing noted of the hip joint  Bony alignment is maintained  Soft tissues are unremarkable      RIGHT HIP:  Moderate narrowing noted of the hip joint  Bony alignment is maintained  Soft tissues are unremarkable      IMPRESSION:     No acute fracture  LUMBAR SPINE     INDICATION:  M54 5: Low back pain   History taken directly from the electronic ordering system           COMPARISON: None     VIEWS:  AP, lateral, bilateral oblique and coned down projections     IMAGES:  5     FINDINGS:     Alignment is unremarkable      Mild to moderate anterior wedging is noted of the L1 vertebral body of indeterminate age  There is evidence of prior kyphoplasty to the L2 vertebral body where there is mild vertebral body height loss  Vertebral body height is otherwise maintained  The bones are demineralized      There is moderate disc space narrowing at L1-L2 and L5-S1  There is otherwise mild disc space narrowing in the lumbar spine with scattered mild to moderate marginal osteophyte formation      Dense aortic wall calcification is noted  Aortic wall calcification is distended distally suggestive of underlying abdominal aortic aneurysm measuring up to 4 5 cm in diameter      IMPRESSION:     1  Mild to moderate anterior wedging is noted of the L1 vertebral body of indeterminate age  This could be further evaluated with MRI  There is evidence of prior kyphoplasty to the L2 vertebral body where there is mild vertebral body height loss  2   Evidence for an underlying abdominal aortic aneurysm measuring up to 4 5 cm in diameter    Recommend follow-up with CT or ultrasound

## 2020-09-04 ENCOUNTER — TELEPHONE (OUTPATIENT)
Dept: OBGYN CLINIC | Facility: CLINIC | Age: 80
End: 2020-09-04

## 2020-09-04 ENCOUNTER — HOSPITAL ENCOUNTER (OUTPATIENT)
Dept: MRI IMAGING | Facility: HOSPITAL | Age: 80
Discharge: HOME | End: 2020-09-04
Attending: ANESTHESIOLOGY
Payer: MEDICARE

## 2020-09-04 DIAGNOSIS — M54.16 LUMBAR RADICULOPATHY: ICD-10-CM

## 2020-09-04 PROCEDURE — 72148 MRI LUMBAR SPINE W/O DYE: CPT

## 2020-09-04 PROCEDURE — G1004 CDSM NDSC: HCPCS

## 2020-09-04 NOTE — TELEPHONE ENCOUNTER
Scheduled pt for L5 S1 LESI for 9/10/20  Went over pre procedure instructions below:  Pt denies rx blood thinners  Nothing to eat or drink 1 hr prior to procedure  Need to arrange transportation  Proper clothing for procedure  If ill or placed on antibiotics please call to reschedule

## 2020-09-04 NOTE — TELEPHONE ENCOUNTER
----- Message from Walden Buerger, MD sent at 9/4/2020  2:03 PM EDT -----  Please schedule for ILESI at L5-S1; MRI findings of left L5 nerve root impingement from disc dessication which correlate with his pain

## 2020-09-04 NOTE — TREATMENT PLAN
Will schedule for ILESI at L5-S1; MRI findings of left L5 nerve root impingement from disc dessication which correlate with his pain  Radicular pain>arthritic pain at this time  He has lumbar facet arthropathy and we will plan for Lumbar MBBs if ILESI does not improve his quality of life/functionality  Discussed with patient also the 3 4cm anyeurism  He is followed by vascular surgery for this and reports that it was previously around this measurement a year ago

## 2020-09-08 ENCOUNTER — TELEPHONE (OUTPATIENT)
Dept: PAIN MEDICINE | Facility: CLINIC | Age: 80
End: 2020-09-08

## 2020-09-08 NOTE — TELEPHONE ENCOUNTER
Pt called in to cancel /reschedule his procedure on 09/10/2020  Please be advise thank you        Please call patient back @  [phone]

## 2020-09-09 NOTE — PRE-PROCEDURE INSTRUCTIONS
Pre-Surgery Instructions:   Medication Instructions    albuterol (ProAir HFA) 90 mcg/act inhaler Patient was instructed by Physician and understands   Anoro Ellipta 62 5-25 MCG/INH inhaler Patient was instructed by Physician and understands   aspirin (Aspirin Adult Low Dose) 81 mg EC tablet Patient was instructed by Physician and understands   atorvastatin (LIPITOR) 20 mg tablet Patient was instructed by Physician and understands   Calcium Carbonate-Vitamin D (Calcium 600+D) 600-400 MG-UNIT per tablet Patient was instructed by Physician and understands   carvedilol (COREG) 3 125 mg tablet Patient was instructed by Physician and understands   Cholecalciferol (Vitamin D-3) 25 MCG (1000 UT) CAPS Patient was instructed by Physician and understands   Coenzyme Q10 (CoQ10) 100 MG CAPS Patient was instructed by Physician and understands   denosumab (PROLIA) 60 mg/mL Patient was instructed by Physician and understands   Flomax 0 4 MG Patient was instructed by Physician and understands   glimepiride (AMARYL) 1 mg tablet Patient was instructed by Physician and understands   glucose blood test strip Patient was instructed by Physician and understands   Januvia 100 MG tablet Patient was instructed by Physician and understands   Lancets (freestyle) lancets Patient was instructed by Physician and understands   montelukast (SINGULAIR) 10 mg tablet Patient was instructed by Physician and understands   oxybutynin (DITROPAN) 5 mg tablet Patient was instructed by Physician and understands   Polyethylene Glycol 3350 GRAN Patient was instructed by Physician and understands   sertraline (ZOLOFT) 50 mg tablet Patient was instructed by Physician and understands   Synthroid 175 MCG tablet Patient was instructed by Physician and understands      Education Index     Med Instructions  Troubleshoot    Alpha-1 adrenergic blocker Med Class     Continue to take this medication on your normal schedule  If this is an oral medication and you take it in the morning, then you may take this medicine with a sip of water  Antidepressant Med Class     Continue to take this medication on your normal schedule  If this is an oral medication and you take it in the morning, then you may take this medicine with a sip of water  Antiepileptic Med Class     Continue to take this medication on your normal schedule  If this is an oral medication and you take it in the morning, then you may take this medicine with a sip of water  ASA Med Class: Aspirin     Should be discontinued at least one week prior to planned operation, unless specifically stated otherwise by surgical service  Your Surgeon may have patient stop taking aspirin up to a week before surgery if having intracranial, middle ear, posterior eye, spine surgery or prostate surgery  [Patients taking aspirin for coronary stents should be reviewed by an anesthesiologist in the optimization clinic  Please do not discontinue aspirin in patients with coronary stents unless given specific permission to do so by the cardiologist who prescribed medication ]   If your surgeon approves please continue to take this medication on your normal schedule  You may take this medication on the morning of your surgery with a sip of water  Beta blocker Med Class     Continue to take this heart medication on your normal schedule  If this is an oral medication and you take it in the morning, then you may take this medicine with a sip of water  Herbal Med Class     Stop taking this herbal medications at least one week prior to surgery/procedure  Inhalational Med Class     Continue to take these inhaler medications on your normal schedule up to and including the day of surgery     Insulin Med Class     Pre-Surgery/Procedure Instructions for Adult Patients who Take Medicine for Diabetes or to Control their Blood Sugar     Day Before Surgery/Procedure  Use the directions based on the type of medicine you take for your diabetes  1  If you are having a procedure that does not require a bowel prep:  ? Pre-Mixed Insulin (Intermediate Acting: Humalog 75/25, Humulin 70/30  Novolog 70/30, Regular Insulin)  § Take ½ your regular dose the evening before your procedure  ? Rapid/Fast Acting Insulin/Long Acting Insulin (Humalog U200, NovoLog, Apidra, Lantus, Levemir, Myrene Pancoast, [place])  § Take your FULL regular dose the day before procedure  ? Oral Diabetic Medicines including Glipizide/Glimepiride/Glucotrol (sulfonylurea)  § Take your regular dose with dinner the evening before your procedure  2  If you are having a procedure (e g  Colonoscopy) that requires a bowel prep and you are allowed to have at least a clear liquid diet:  ? Pre-Mixed Insulin (Intermediate Acting: Humalog 75/25, Humulin 70/30, Novolog 70/30, Regular Insulin)  § Take ½ your regular dose the evening before your procedure  ? Rapid/Fast Acting Insulin (Humalog U200, NovoLog, Apidra, Fiasp)  § Take ½ your regular dose the evening before your procedure  ? Long Acting Insulin (Lantus, Levemir, Myrene Pancoast)  § Take your FULL regular dose the day before procedure  ? Oral Glipizide/Glimepiride/Glucotrol (sulfonylurea)  § Take ½ your regular dose the evening before your procedure  ? Oral Diabetic Medicines that are NOT Glipizide/Glimepiride/Glucotrol  § Take your regular dose with dinner in the evening before your procedure      Day of Surgery/Procedure  · Long Acting Insulin (Lantus, Levemir, Myrene Pancoast)  ? If you usually take your Long-Acting Insulin in the morning, take the full dose as scheduled  · With the exception of the morning Long-Acting Insulin noted above, DO NOT take ANY diabetic medicine on the day of your procedure unless you were instructed by the doctor who manages your diabetic medicines  · Continue to check your blood sugars    · If you have an insulin pump then consult with your Endocrinologist for instructions  · If you cannot see your Endocrinologist, on the day of the procedure set your insulin pump to your basal rate only  Please bring your insulin pump supplies to the hospital      This Educational material has been approved by the Patient Education Advisory Committee  Date prepared: 1/17/2018          Expiration date: 1/17/2019        Approval Number:                     Leukotriene Inhibitor Med Class     Continue to take this medication on your normal schedule  If this is an oral medication and you take it in the morning, then you may take this medicine with a sip of water  Statin Med Class     Continue to take this medication on your normal schedule  If this is an oral medication and you take it in the morning, then you may take this medicine with a sip of water  Thyroxine Med Class     Continue to take this medication on your normal schedule  If this is an oral medication and you take it in the morning, then you may take this medicine with a sip of water  Vitamin Med Class     You may continue to take any vitamin that your surgeon has prescribed to you up to the day before surgery  If your surgeon has not specifically prescribed this vitamin or instructed you to continue then stop taking 7 days prior to surgery  Urinary antispasmodics Med Class     Continue this medication up to the evening before surgery/procedure, but do not take the morning of the day of surgery

## 2020-09-15 ENCOUNTER — HOSPITAL ENCOUNTER (OUTPATIENT)
Facility: HOSPITAL | Age: 80
Setting detail: OUTPATIENT SURGERY
Discharge: HOME | End: 2020-09-15
Attending: ANESTHESIOLOGY | Admitting: ANESTHESIOLOGY
Payer: MEDICARE

## 2020-09-15 ENCOUNTER — APPOINTMENT (OUTPATIENT)
Dept: RADIOLOGY | Facility: HOSPITAL | Age: 80
End: 2020-09-15
Payer: MEDICARE

## 2020-09-15 VITALS
TEMPERATURE: 98.3 F | WEIGHT: 250 LBS | SYSTOLIC BLOOD PRESSURE: 134 MMHG | RESPIRATION RATE: 20 BRPM | HEART RATE: 70 BPM | HEIGHT: 71 IN | BODY MASS INDEX: 35 KG/M2 | DIASTOLIC BLOOD PRESSURE: 67 MMHG | OXYGEN SATURATION: 99 %

## 2020-09-15 LAB — GLUCOSE SERPL-MCNC: 117 MG/DL (ref 65–140)

## 2020-09-15 PROCEDURE — 82948 REAGENT STRIP/BLOOD GLUCOSE: CPT

## 2020-09-15 RX ORDER — METHYLPREDNISOLONE ACETATE 80 MG/ML
80 INJECTION, SUSPENSION INTRA-ARTICULAR; INTRALESIONAL; INTRAMUSCULAR; PARENTERAL; SOFT TISSUE ONCE
Status: COMPLETED | OUTPATIENT
Start: 2020-09-15 | End: 2020-09-15

## 2020-09-15 RX ORDER — LIDOCAINE HYDROCHLORIDE 10 MG/ML
5 INJECTION, SOLUTION EPIDURAL; INFILTRATION; INTRACAUDAL; PERINEURAL ONCE
Status: COMPLETED | OUTPATIENT
Start: 2020-09-15 | End: 2020-09-15

## 2020-09-15 RX ORDER — SODIUM CHLORIDE 9 MG/ML
INJECTION INTRAVENOUS AS NEEDED
Status: DISCONTINUED | OUTPATIENT
Start: 2020-09-15 | End: 2020-09-15 | Stop reason: HOSPADM

## 2020-09-15 NOTE — DISCHARGE INSTRUCTIONS
PLEASE SCHEDULE 2 WEEK FOLLOW UP BY CALLING THE [facility] [place]: [phone]      Epidural Pain Control for Adults   WHAT YOU SHOULD KNOW:   Epidural pain control is when pain medicine is put into the space around your spinal cord (epidural space)  An epidural is a way to get pain medicine without repeated injections  An epidural can help decrease acute (short-term) pain from childbirth, surgery, or an accident  It can also be used to decrease long-term pain, like cancer pain  AFTER YOU LEAVE:   Medicines:   · Pain medicine:  Do not wait until the pain is severe before you use your medicine  Pain medicine may make you drowsy  Do not drive or use heavy equipment while you take medicine for pain  · Take your medicine as directed  Call your healthcare provider if you think your medicine is not helping or if you have side effects  Tell him if you are allergic to any medicine  Keep a list of the medicines, vitamins, and herbs you take  Include the amounts, and when and why you take them  Bring the list or the pill bottles to follow-up visits  Carry your medicine list with you in case of an emergency  Care for your exit site:  Keep your exit site clean and covered  You may be told to change the bandage every day for the first 7 days  After that, change it every second day  Change your bandages any time they get wet or dirty  If you cannot reach the incision areas, ask someone to help you  Change your bandage as directed: You will need 4 sterile cotton swabs, antibacterial solution, 2 sterile precut gauze pads, 1 sterile 4x4 gauze pad, and a roll of 1 inch wide tape  · Wash your hands:  Use soap and water, and dry them completely  · Carefully remove the old bandage:  Do not pull on the catheter  · Look at your exit site:  Contact your primary healthcare provider if you see redness, swelling, drainage, or any other changes      · Clean the skin around your exit site:  Use a cotton swab dipped in antibacterial solution  Clean your skin in a [place], starting at the catheter and moving out about 3 inches  Throw away the cotton swab  Repeat this step 2 more times  · Clean the catheter:  Gently wipe the catheter with another cotton swab dipped in antibacterial solution  Start from the exit site and go up to the filter  · Cover the exit site with the sterile gauze pads:  Gently put the catheter through the precut slit in the first gauze pad  Do not touch the side of the pad that will touch your skin  Turn the second precut gauze pad so the slit is turned in the opposite direction of the first pad  Gently put the catheter through the slit and put the gauze over the exit site  · Cover with the 4x4 gauze pad:  Use the 4x4 gauze pad to cover the smaller precut gauze pads  Tape the edges of the larger gauze pad  Loop the catheter over the 4x4 gauze pad and tape the catheter in place  Change the filter on the machine as directed: You may be taught to change the filter and tubing for the medicine  You will need a new sterile filter, sterile cotton swab, antibacterial solution, and a roll of 1 inch wide tape  You will also need new tubing and new syringe of medicine  · Wash your hands:  Use soap and water, and dry them completely  · Open the filter and injection cap package:  Remove the cap from the new filter  Do not touch the end of the filter  · Connect the new filter to new tubing: The tubing should be attached to the new medicine syringe  Let the medicine flow through the filter  · Clean the area with antibacterial solution:  Remove the tape from the old filter  Wipe the catheter tip (where the old filter is attached to the catheter) with a cotton swab dipped in antibacterial solution  Allow it to air dry  · Remove the old filter:  Ask how or where to throw it away  Keep it away from children and pets      · Connect the new filter to the catheter:  Tape the area where the filter hooks to the catheter to keep it from coming apart  When you can bathe: You may take a shower 1 week after the catheter has been placed  Cover the catheter and exit site with a waterproof covering such as plastic food wrap  Tape around the edges of the wrap to keep it from leaking  Put on a clean, new bandage after you shower  Contact your primary healthcare provider or pain specialist if:   · You feel that your pain is not being controlled by the medicine  · The skin around your incisions is red or swollen, or the incision has pus draining from it  · You have trouble moving, urinating, or having a bowel movement  · You have questions about or problems with your medicine pump  · You have questions or concerns about your condition or care  Seek care immediately or call 911 if:   · You have a fever  · You have trouble breathing, are dizzy, or have itchy skin or a rash  · You have a stiff neck or trouble thinking clearly  © 2014 [employer] is for End User's use only and may not be sold, redistributed or otherwise used for commercial purposes  All illustrations and images included in CareNotes® are the copyrighted property of [employer]  or [employer]  The above information is an  only  It is not intended as medical advice for individual conditions or treatments  Talk to your doctor, nurse or pharmacist before following any medical regimen to see if it is safe and effective for you

## 2020-09-15 NOTE — INTERVAL H&P NOTE
H&P reviewed  After examining the patient I find no changes in the patients condition since the H&P had been written      Vitals:    09/15/20 0804   BP: 147/68   Pulse: 65   Resp: 16   Temp: 98 2 °F (36 8 °C)   SpO2: 99%

## 2020-09-15 NOTE — OP NOTE
OPERATIVE REPORT  PATIENT NAME: Verna Melgoza    :  1940  MRN: [id number]  Pt Location:  GI ROOM 01    SURGERY DATE: 9/15/2020    Surgeon(s) and Role:      Varinder Ibarra MD - Primary    Preop Diagnosis:  Lumbar radiculopathy [M54 16]    * No Diagnosis Codes entered *    Procedure(s) (LRB):  L5 S1 Lumbar Epidural Steroid Injection (87361) (N/A)    Specimen(s):  * No specimens in log *    Estimated Blood Loss:   Minimal    Drains:  * No LDAs found *    Anesthesia Type:   Local    Operative Indications:  Lumbar radiculopathy [M54 16]      Operative Findings:  L5-S1 epidurogram    Complications:   None    Procedure and Technique:  Please see detailed procedure note     I was present for the entire procedure    Patient Disposition:  PACU     SIGNATURE: Stephen Myles MD  DATE: September 15, 2020  TIME: 7:39 AM

## 2020-09-22 ENCOUNTER — TELEPHONE (OUTPATIENT)
Dept: PAIN MEDICINE | Facility: CLINIC | Age: 80
End: 2020-09-22

## 2020-09-29 ENCOUNTER — OFFICE VISIT (OUTPATIENT)
Dept: PAIN MEDICINE | Facility: CLINIC | Age: 80
End: 2020-09-29
Payer: MEDICARE

## 2020-09-29 VITALS — RESPIRATION RATE: 18 BRPM | BODY MASS INDEX: 35 KG/M2 | WEIGHT: 250 LBS | HEIGHT: 71 IN | TEMPERATURE: 97.6 F

## 2020-09-29 DIAGNOSIS — M54.16 LUMBAR RADICULOPATHY: Primary | ICD-10-CM

## 2020-09-29 PROCEDURE — 99214 OFFICE O/P EST MOD 30 MIN: CPT | Performed by: ANESTHESIOLOGY

## 2020-09-29 NOTE — PATIENT INSTRUCTIONS
Core Strengthening Exercises   WHAT YOU NEED TO KNOW:   Your core includes the muscles of your lower back, hip, pelvis, and abdomen  Core strengthening exercises help heal and strengthen these muscles  This helps prevent another injury, and keeps your pelvis, spine, and hips in the correct position  DISCHARGE INSTRUCTIONS:   Contact your healthcare provider if:   · You have sharp or worsening pain during exercise or at rest     · You have questions or concerns about your shoulder exercises  Safety tips:  Talk to your healthcare provider before you start an exercise program  A physical therapist can teach you how to do core strengthening exercises safely  · Do the exercises on a mat or firm surface  A firm surface will support your spine and avoid low back pain  Do not do these exercises on a bed  · Move slowly and smoothly  Avoid fast or jerky motions  · Stop if you feel pain  Core exercises should not feel painful  Stop if you feel pain  · Breathe normally during core exercises  Do not hold your breath  This may cause an increase in blood pressure and prevent muscle strengthening  Your healthcare provider will tell you when to inhale and exhale during the exercise  · Begin all of your exercises with abdominal bracing  Abdominal bracing helps warm up your core muscles  You can also practice abdominal bracing throughout the day while you are sitting or standing  Lie on your back with your knees bent and feet flat on the floor  Place your arms in a relaxed position beside your body  Tighten your abdominal muscles  Pull your belly button in and up toward your spine  Hold for 5 seconds  Relax your muscles  Repeat 10 times  Core strengthening exercises: Your healthcare provider will tell you how often to do these exercises  The provider will also tell you how many repetitions of each exercise you should do  Hold each exercise for 5 seconds or as directed   As you get stronger, increase your hold to 10 to 15 seconds  You can do some of these exercises on a stability ball, or with a weight  Ask your healthcare provider how to use a stability ball or weight for these exercises:  · Bent leg side bridge:  Lie on one side with your legs, hips, and shoulders in a straight line  Prop yourself up onto your forearm so your elbow is directly below your shoulder  Bend your knees to 90°  Lift your hips off the floor  Balance yourself on your forearm and the side of your knee  Hold this position  Repeat on the other side  · Straight leg side bridge:  Lie on one side with your legs, hips, and shoulders in a straight line  Prop yourself up onto your forearm so your elbow is directly below your shoulder  Your top leg can rest in front of your bottom leg for support  Lift your hips off the floor  Balance yourself on your forearm and the outside of your flexed foot  Do not let your ankle bend sideways  Hold this position  Repeat on the other side  · Superman:  Lie on your stomach  Extend your arms forward on the floor  Tighten your abdominal muscles and lift your right hand and left leg off the floor  Hold this position  Slowly return to the starting position  Tighten your abdominal muscles and lift your left hand and right leg off the floor  Hold this position  Slowly return to the starting position  · Curl up:  Lie on your back with your knees bent and feet flat on the floor  Place your hands, palms down, underneath your lower back  Next, with your elbows on the floor, lift your shoulders and chest 2 to 3 inches off the floor  Keep your head in line with your shoulders  Hold this position  Slowly return to the starting position  · Straight leg raises:  Lie on your back with one leg straight  Bend the other knee and place your foot flat on the floor  Tighten your abdominal muscles  Keep your leg straight and slowly lift it straight up 6 to 12 inches off the floor   Hold this position  Lower your leg slowly  Do as many repetitions as directed on this side  Repeat with the other leg  · Plank:  Lie on your stomach  Bend your elbows and place your forearms flat on the floor  Lift your chest, stomach, and knees off the floor  Make sure your elbows are below your shoulders  Your body should be in a straight line  Do not let your hips or lower back sink to the ground  Squeeze your abdominal muscles together and hold for 15 seconds  To make this exercise harder, hold for 30 seconds or lift 1 leg at a time  · Bicycles:  Lie on your back  Bend both knees and bring them toward your chest  Your calves should be parallel to the floor  Place the palms of your hands on the back of your head  Straighten your right leg and keep it lifted 2 inches off the floor  Raise your head and shoulders off the floor and twist towards your left  Keep your head and shoulders lifted  Bend your right knee while you straighten your left leg  Keep your left leg 2 inches off the floor  Twist your head and chest towards the left leg  Continue to straighten 1 leg at a time and twist        Follow up with your healthcare provider as directed:  Write down your questions so you remember to ask them during your visits  © 2017 [employer] Information is for End User's use only and may not be sold, redistributed or otherwise used for commercial purposes  All illustrations and images included in CareNotes® are the copyrighted property of [employer]  or [employer]ol[employer] Tai  The above information is an  only  It is not intended as medical advice for individual conditions or treatments  Talk to your doctor, nurse or pharmacist before following any medical regimen to see if it is safe and effective for you

## 2020-09-29 NOTE — PROGRESS NOTES
Assessment  There are no diagnoses linked to this encounter  Symptomatology of neurogenic claudication which improved tremendously with interlaminar epidural steroid injection at L5-S1  Chronic arthritic like low back pain with radicular features in L5 and S1 dermatomal distribution bilaterally  Pain had been progressive accompanied by weakness numbness and paresthesias; currently pain is now resolved  He is now able to walk more with significantly less pain  X-ray lumbar spine shows degenerative disc disease along with facet arthropathy at lower lumbar levels  Patient also has pain with lumbar facet loading as well  TTP over trochanteric bursa R>L and pain with internal and external rotation of hips b/l  Plan    -MRI lumbar spine noncontrast  -gabapentin 100 mg t i d  prn neuropathic pain  -PT referral for lumbar radiculopathy  [place] Prescription Drug Monitoring Program report was reviewed and was appropriate     Complete risks and benefits including bleeding, infection, tissue reaction, nerve injury and allergic reaction were discussed  The approach was demonstrated using models and literature was provided  Verbal consent was obtained  My impressions and treatment recommendations were discussed in detail with the patient who verbalized understanding and had no further questions  Discharge instructions were provided  I personally saw and examined the patient and I agree with the above discussed plan of care  No orders of the defined types were placed in this encounter  History of Present Illness    Constantine Mon is a 68 y o  male with pmhx of ESRD and DM-2, obesity previously presenting with chronic lumbar radicular pain in an L5 and S1 dermatomal distributions bilaterally  The patient rates the pain at a 8/10 accompanied by electric shock-like shooting features and crampy burning pain in the aforementioned dermatomal distributions    The pain was worse in the mornings as well as the end of the day; exertion such as walking for long periods of time used to exacerbate the pain  The patient could hardly walk more than a few blocks without having debilitating pain  Weakness numbness and paresthesias accompanied the pain  He tries to maintain an active lifestyle and finds that the current degree of pain seems to compromises this  This pain is improved more than 80% with recent interlaminar epidural steroid injection  He has arthritic features of the pain too described as achy, nagging, indolent, stabbing and crampy  The pain was significantly impacting independent activities of daily living and contributed to significant disability  He has attempted PT in the past with exacerbation of the pain  He has not tried gabapentin, but nsaids are a relative contraindication secondary to renal insufficiency  He has tried epidural steroid injections in the past with benefit for about a month  He denies any bowel or bladder dysfunction/incontinence  I have personally reviewed and/or updated the patient's past medical history, past surgical history, family history, social history, current medications, allergies, and vital signs today  Review of Systems   Constitutional: Positive for activity change  HENT: Negative  Eyes: Negative  Respiratory: Negative  Cardiovascular: Negative  Gastrointestinal: Negative  Endocrine: Negative  Genitourinary: Negative  Musculoskeletal: Positive for arthralgias, back pain, gait problem and myalgias  Skin: Negative  Allergic/Immunologic: Negative  Neurological: Positive for weakness and numbness  Hematological: Negative  Psychiatric/Behavioral: Negative  All other systems reviewed and are negative  There is no problem list on file for this patient        Past Medical History:   Diagnosis Date    AAA (abdominal aortic aneurysm) (HCC)     Arthritis     Chronic kidney disease     Chronic pain disorder     low back    Colon polyp     [facility]ch[facility]     CPAP (continuous positive airway pressure) dependence     Diabetes [facility] [address] [facility]     Diabetes mellitus [facility] [address] [facility]     Disease of thyroid gland     Dyslipidemia     MARY (generalized anxiety disorder)     GERD (gastroesophageal reflux disease)     Hyperlipidemia     Hypertension     Obesity     Osteoporosis     Shortness of breath     Sleep apnea     TIA (transient ischemic attack)     Wears dentures        Past Surgical History:   Procedure Laterality Date    BACK SURGERY  2009    COLONOSCOPY      EPIDURAL BLOCK INJECTION N/A 9/15/2020    Procedure: L5 S1 Lumbar Epidural Steroid Injection (46572);   Surgeon: Lee Calhoun MD;  Location: [facility];  Service: Pain Management     FL GUIDED NEEDLE PLAC BX/ASP/INJ  9/15/2020    FRACTURE SURGERY      stress fracture of pelvis    LEG SURGERY         Family History   Problem Relation Age of Onset    Diabetes Mother     Osteoporosis Mother     Heart disease Father        Social History     Occupational History    Not on file   Tobacco Use    Smoking status: Former Smoker     Types: Cigarettes     Last attempt to quit: 9/15/2007     Years since quittin 0    Smokeless tobacco: Never Used   Substance and Sexual Activity    Alcohol use: Yes     Comment: rarely    Drug use: Never    Sexual activity: Not on file       Current Outpatient Medications on File Prior to Visit   Medication Sig    albuterol (ProAir HFA) 90 mcg/act inhaler Inhale 2 puffs every 6 (six) hours as needed for wheezing    Anoro Ellipta 62 5-25 MCG/INH inhaler     aspirin (Aspirin Adult Low Dose) 81 mg EC tablet Take by mouth    atorvastatin (LIPITOR) 20 mg tablet     Calcium Carbonate-Vitamin D (Calcium 600+D) 600-400 MG-UNIT per tablet     carvedilol (COREG) 3 125 mg tablet TAKE ONE TABLET BY MOUTH TWICE A DAY WITH MORNING AND EVENING MEALS    Cholecalciferol (Vitamin D-3) 25 MCG (1000 UT) CAPS Take by mouth  Coenzyme Q10 (CoQ10) 100 MG CAPS Take by mouth    denosumab (PROLIA) 60 mg/mL Inject under the skin    Flomax 0 4 MG     gabapentin (NEURONTIN) 100 mg capsule Take 1 capsule (100 mg total) by mouth 3 (three) times a day    glimepiride (AMARYL) 1 mg tablet     glucose blood test strip FreeStyle Lite Strips    Januvia 100 MG tablet     L-Methylfolate-B6-B12 (FOLTX PO) Take by mouth    Lancets (freestyle) lancets FreeStyle Lancets 28 gauge    montelukast (SINGULAIR) 10 mg tablet Take 10 mg by mouth daily    oxybutynin (DITROPAN) 5 mg tablet     Polyethylene Glycol 3350 GRAN by Does not apply route    sertraline (ZOLOFT) 50 mg tablet     Synthroid 175 MCG tablet      No current facility-administered medications on file prior to visit  Allergies   Allergen Reactions    Oxycodone Anxiety     agitation         Physical Exam    Temp 97 6 °F (36 4 °C)   Resp 18   Ht 5' 11" (1 803 m)   Wt 113 kg (250 lb)   BMI 34 87 kg/m²     Constitutional: normal, well developed, well nourished, alert, in no distress and non-toxic and no overt pain behavior  and obese  Eyes: anicteric  HEENT: grossly intact  Neck: supple, symmetric, trachea midline and no masses   Pulmonary:even and unlabored  Cardiovascular:No edema or pitting edema present  Skin:Normal without rashes or lesions and well hydrated  Psychiatric:Mood and affect appropriate  Neurologic:Cranial Nerves II-XII grossly intact Sensation grossly intact; no clonus negative espinosa's  Reflexes 2+ and brisk  SLR positive left>right sided  Musculoskeletal:normal gait  4/5 strength with left hip flexion and left ankle dorsiflexion  Normal heel toe and tip toe walking  Notable pain with lumbar facet loading bilaterally and with lateral spine rotation  ttp over lumbar paraspinal muscles  Negative brionna's test, negative gaenslen's negative SIJ loading bilaterally   ttp over right trochanteric bursa and internal and external rotation of right hip     Imaging    BILATERAL HIPS AND PELVIS     INDICATION:  Pain      COMPARISON:  None     VIEWS: AP pelvis and coned down views of each hip     IMAGES:  5     FINDINGS:     No acute fracture  Subcortical cyst noted at the right ischial tuberosity may be degenerative  Visualized bony pelvis appears intact      LEFT HIP:  Moderate narrowing noted of the hip joint  Bony alignment is maintained  Soft tissues are unremarkable      RIGHT HIP:  Moderate narrowing noted of the hip joint  Bony alignment is maintained  Soft tissues are unremarkable      IMPRESSION:     No acute fracture  LUMBAR SPINE     INDICATION:  M54 5: Low back pain  History taken directly from the electronic ordering system           COMPARISON: None     VIEWS:  AP, lateral, bilateral oblique and coned down projections     IMAGES:  5     FINDINGS:     Alignment is unremarkable      Mild to moderate anterior wedging is noted of the L1 vertebral body of indeterminate age  There is evidence of prior kyphoplasty to the L2 vertebral body where there is mild vertebral body height loss  Vertebral body height is otherwise maintained  The bones are demineralized      There is moderate disc space narrowing at L1-L2 and L5-S1  There is otherwise mild disc space narrowing in the lumbar spine with scattered mild to moderate marginal osteophyte formation      Dense aortic wall calcification is noted  Aortic wall calcification is distended distally suggestive of underlying abdominal aortic aneurysm measuring up to 4 5 cm in diameter      IMPRESSION:     1  Mild to moderate anterior wedging is noted of the L1 vertebral body of indeterminate age  This could be further evaluated with MRI  There is evidence of prior kyphoplasty to the L2 vertebral body where there is mild vertebral body height loss  2   Evidence for an underlying abdominal aortic aneurysm measuring up to 4 5 cm in diameter    Recommend follow-up with CT or ultrasound

## 2020-12-05 NOTE — PROCEDURES
Pre-procedure Diagnosis: Lumbar Radiculopathy  Post-procedure Diagnosis: Lumbar Radiculopathy  Procedure Title(s):  1  [L5-S1] interlaminar epidural steroid injection      2  Intraoperative fluoroscopy  Attending Surgeon:   Rosario Negro MD  Anesthesia:   Local     Indications: The patient is a 68y o  year-old male with a diagnosis of Lumbar Radiculopathy  The patient's history and physical exam were reviewed  The risks, benefits and alternatives to the procedure were discussed, and all questions were answered to the patient's satisfaction  The patient agreed to proceed, and written informed consent was obtained  Procedure in Detail: The patient was brought into the procedure room and placed in the prone position on the fluoroscopy table  The area of the lumbar spine was prepped with chlorhexidine gluconate solution times one and draped in a sterile manner  The [L5-S1] interspace was identified and marked under AP fluoroscopy  The skin and subcutaneous tissues in the area were anesthetized with 1% lidocaine  A 18-gauge Tuohy epidural needle was directed toward the interspace under fluoroscopic guidance until the ligamentum flavum was engaged  From this point, a loss of resistance technique with air was used to identify entrance of the needle into the epidural space  Once an appropriate loss was obtained, negative aspiration was confirmed, and 1 ml Omnipaque 240 contrast solution was injected  An appropriate epidurogram was noted  Then, after negative aspiration, a solution consisting of 1-mL depo-medrol (80mg/mL) and 3-mL preservative-free saline was easily injected  The needle was removed with a 1% lidocaine flush  The patient's back was cleaned and a bandage was placed over the site of needle insertion  Disposition: The patient tolerated the procedure well, and there were no apparent complications   The patient was taken to the recovery area where written discharge instructions for the procedure Spoke with mother regarding test results. Dr Carvalho sent amoxicilling to [facility] in [place] were given       Estimated Blood Loss: None  Specimens Obtained: N/A

## 2021-10-22 DIAGNOSIS — I71.4 ABDOMINAL AORTIC ANEURYSM, WITHOUT RUPTURE (HCC): ICD-10-CM

## 2021-10-22 DIAGNOSIS — M54.41 LUMBAGO WITH SCIATICA, RIGHT SIDE: ICD-10-CM

## 2021-10-22 DIAGNOSIS — G89.29 OTHER CHRONIC PAIN: ICD-10-CM

## 2021-10-22 DIAGNOSIS — M54.42 LUMBAGO WITH SCIATICA, LEFT SIDE: ICD-10-CM

## 2024-05-26 ENCOUNTER — APPOINTMENT (EMERGENCY)
Dept: RADIOLOGY | Facility: HOSPITAL | Age: 84
DRG: 192 | End: 2024-05-26
Payer: MEDICARE

## 2024-05-26 ENCOUNTER — HOSPITAL ENCOUNTER (INPATIENT)
Facility: HOSPITAL | Age: 84
LOS: 2 days | Discharge: HOME | DRG: 192 | End: 2024-05-28
Attending: EMERGENCY MEDICINE | Admitting: HOSPITALIST
Payer: MEDICARE

## 2024-05-26 DIAGNOSIS — R06.00 DYSPNEA: Primary | ICD-10-CM

## 2024-05-26 DIAGNOSIS — J44.1 COPD WITH ACUTE EXACERBATION (HCC): ICD-10-CM

## 2024-05-26 DIAGNOSIS — R09.02 HYPOXIA: ICD-10-CM

## 2024-05-26 DIAGNOSIS — M54.16 LUMBAR RADICULOPATHY: ICD-10-CM

## 2024-05-26 PROBLEM — G47.33 OSA (OBSTRUCTIVE SLEEP APNEA): Status: ACTIVE | Noted: 2024-05-26

## 2024-05-26 PROBLEM — I73.9 PERIPHERAL ARTERIAL DISEASE (HCC): Status: ACTIVE | Noted: 2024-05-26

## 2024-05-26 PROBLEM — I10 PRIMARY HYPERTENSION: Status: ACTIVE | Noted: 2024-05-26

## 2024-05-26 PROBLEM — Z85.46 HISTORY OF PROSTATE CANCER: Status: ACTIVE | Noted: 2024-05-26

## 2024-05-26 PROBLEM — E11.40 TYPE 2 DIABETES MELLITUS WITH DIABETIC NEUROPATHY, WITHOUT LONG-TERM CURRENT USE OF INSULIN (HCC): Status: ACTIVE | Noted: 2024-05-26

## 2024-05-26 LAB
2HR DELTA HS TROPONIN: 3 NG/L
4HR DELTA HS TROPONIN: -2 NG/L
ALBUMIN SERPL BCP-MCNC: 4.2 G/DL (ref 3.5–5)
ALP SERPL-CCNC: 76 U/L (ref 34–104)
ALT SERPL W P-5'-P-CCNC: 14 U/L (ref 7–52)
ANION GAP SERPL CALCULATED.3IONS-SCNC: 9 MMOL/L (ref 4–13)
AST SERPL W P-5'-P-CCNC: 23 U/L (ref 13–39)
BASOPHILS # BLD AUTO: 0.03 THOUSANDS/ÂΜL (ref 0–0.1)
BASOPHILS NFR BLD AUTO: 0 % (ref 0–1)
BILIRUB SERPL-MCNC: 0.66 MG/DL (ref 0.2–1)
BNP SERPL-MCNC: 65 PG/ML (ref 0–100)
BUN SERPL-MCNC: 15 MG/DL (ref 5–25)
CALCIUM SERPL-MCNC: 9.4 MG/DL (ref 8.4–10.2)
CARDIAC TROPONIN I PNL SERPL HS: 18 NG/L
CARDIAC TROPONIN I PNL SERPL HS: 20 NG/L
CARDIAC TROPONIN I PNL SERPL HS: 23 NG/L
CHLORIDE SERPL-SCNC: 100 MMOL/L (ref 96–108)
CO2 SERPL-SCNC: 24 MMOL/L (ref 21–32)
CREAT SERPL-MCNC: 1.1 MG/DL (ref 0.6–1.3)
D DIMER PPP FEU-MCNC: 0.56 UG/ML FEU
EOSINOPHIL # BLD AUTO: 0.76 THOUSAND/ÂΜL (ref 0–0.61)
EOSINOPHIL NFR BLD AUTO: 7 % (ref 0–6)
ERYTHROCYTE [DISTWIDTH] IN BLOOD BY AUTOMATED COUNT: 13.6 % (ref 11.6–15.1)
EST. AVERAGE GLUCOSE BLD GHB EST-MCNC: 140 MG/DL
FLUAV RNA RESP QL NAA+PROBE: NEGATIVE
FLUBV RNA RESP QL NAA+PROBE: NEGATIVE
GFR SERPL CREATININE-BSD FRML MDRD: 62 ML/MIN/1.73SQ M
GLUCOSE SERPL-MCNC: 134 MG/DL (ref 65–140)
GLUCOSE SERPL-MCNC: 171 MG/DL (ref 65–140)
GLUCOSE SERPL-MCNC: 183 MG/DL (ref 65–140)
HBA1C MFR BLD: 6.5 %
HCT VFR BLD AUTO: 35.5 % (ref 36.5–49.3)
HGB BLD-MCNC: 12 G/DL (ref 12–17)
IMM GRANULOCYTES # BLD AUTO: 0.05 THOUSAND/UL (ref 0–0.2)
IMM GRANULOCYTES NFR BLD AUTO: 1 % (ref 0–2)
LACTATE SERPL-SCNC: 1 MMOL/L (ref 0.5–2)
LYMPHOCYTES # BLD AUTO: 2.3 THOUSANDS/ÂΜL (ref 0.6–4.47)
LYMPHOCYTES NFR BLD AUTO: 22 % (ref 14–44)
MCH RBC QN AUTO: 30.7 PG (ref 26.8–34.3)
MCHC RBC AUTO-ENTMCNC: 33.8 G/DL (ref 31.4–37.4)
MCV RBC AUTO: 91 FL (ref 82–98)
MONOCYTES # BLD AUTO: 1.1 THOUSAND/ÂΜL (ref 0.17–1.22)
MONOCYTES NFR BLD AUTO: 10 % (ref 4–12)
NEUTROPHILS # BLD AUTO: 6.47 THOUSANDS/ÂΜL (ref 1.85–7.62)
NEUTS SEG NFR BLD AUTO: 60 % (ref 43–75)
NRBC BLD AUTO-RTO: 0 /100 WBCS
PLATELET # BLD AUTO: 245 THOUSANDS/UL (ref 149–390)
PMV BLD AUTO: 9.9 FL (ref 8.9–12.7)
POTASSIUM SERPL-SCNC: 4.3 MMOL/L (ref 3.5–5.3)
PROCALCITONIN SERPL-MCNC: 0.1 NG/ML
PROT SERPL-MCNC: 8.2 G/DL (ref 6.4–8.4)
RBC # BLD AUTO: 3.91 MILLION/UL (ref 3.88–5.62)
RSV RNA RESP QL NAA+PROBE: NEGATIVE
SARS-COV-2 RNA RESP QL NAA+PROBE: NEGATIVE
SODIUM SERPL-SCNC: 133 MMOL/L (ref 135–147)
WBC # BLD AUTO: 10.71 THOUSAND/UL (ref 4.31–10.16)

## 2024-05-26 PROCEDURE — 36415 COLL VENOUS BLD VENIPUNCTURE: CPT | Performed by: EMERGENCY MEDICINE

## 2024-05-26 PROCEDURE — 99223 1ST HOSP IP/OBS HIGH 75: CPT | Performed by: HOSPITALIST

## 2024-05-26 PROCEDURE — 99285 EMERGENCY DEPT VISIT HI MDM: CPT

## 2024-05-26 PROCEDURE — 94640 AIRWAY INHALATION TREATMENT: CPT

## 2024-05-26 PROCEDURE — 96375 TX/PRO/DX INJ NEW DRUG ADDON: CPT

## 2024-05-26 PROCEDURE — 87040 BLOOD CULTURE FOR BACTERIA: CPT | Performed by: EMERGENCY MEDICINE

## 2024-05-26 PROCEDURE — 85025 COMPLETE CBC W/AUTO DIFF WBC: CPT | Performed by: EMERGENCY MEDICINE

## 2024-05-26 PROCEDURE — 83605 ASSAY OF LACTIC ACID: CPT | Performed by: EMERGENCY MEDICINE

## 2024-05-26 PROCEDURE — 80053 COMPREHEN METABOLIC PANEL: CPT | Performed by: EMERGENCY MEDICINE

## 2024-05-26 PROCEDURE — 84484 ASSAY OF TROPONIN QUANT: CPT | Performed by: EMERGENCY MEDICINE

## 2024-05-26 PROCEDURE — 96365 THER/PROPH/DIAG IV INF INIT: CPT

## 2024-05-26 PROCEDURE — 93005 ELECTROCARDIOGRAM TRACING: CPT

## 2024-05-26 PROCEDURE — 0241U HB NFCT DS VIR RESP RNA 4 TRGT: CPT | Performed by: EMERGENCY MEDICINE

## 2024-05-26 PROCEDURE — 94760 N-INVAS EAR/PLS OXIMETRY 1: CPT

## 2024-05-26 PROCEDURE — 82948 REAGENT STRIP/BLOOD GLUCOSE: CPT

## 2024-05-26 PROCEDURE — 94664 DEMO&/EVAL PT USE INHALER: CPT

## 2024-05-26 PROCEDURE — 99285 EMERGENCY DEPT VISIT HI MDM: CPT | Performed by: EMERGENCY MEDICINE

## 2024-05-26 PROCEDURE — 84145 PROCALCITONIN (PCT): CPT | Performed by: HOSPITALIST

## 2024-05-26 PROCEDURE — 71045 X-RAY EXAM CHEST 1 VIEW: CPT

## 2024-05-26 PROCEDURE — 85379 FIBRIN DEGRADATION QUANT: CPT | Performed by: EMERGENCY MEDICINE

## 2024-05-26 PROCEDURE — 83880 ASSAY OF NATRIURETIC PEPTIDE: CPT | Performed by: EMERGENCY MEDICINE

## 2024-05-26 PROCEDURE — 83036 HEMOGLOBIN GLYCOSYLATED A1C: CPT | Performed by: HOSPITALIST

## 2024-05-26 RX ORDER — GABAPENTIN 100 MG/1
100 CAPSULE ORAL
Status: DISCONTINUED | OUTPATIENT
Start: 2024-05-26 | End: 2024-05-28 | Stop reason: HOSPADM

## 2024-05-26 RX ORDER — OXYBUTYNIN CHLORIDE 5 MG/1
5 TABLET ORAL DAILY
Status: DISCONTINUED | OUTPATIENT
Start: 2024-05-26 | End: 2024-05-26

## 2024-05-26 RX ORDER — MIRABEGRON 25 MG/1
25 TABLET, FILM COATED, EXTENDED RELEASE ORAL DAILY
Status: ON HOLD | COMMUNITY
Start: 2024-05-09 | End: 2024-05-26

## 2024-05-26 RX ORDER — SOLIFENACIN SUCCINATE 5 MG/1
5 TABLET, FILM COATED ORAL DAILY
Status: ON HOLD | COMMUNITY
End: 2024-05-26

## 2024-05-26 RX ORDER — CLOPIDOGREL BISULFATE 75 MG/1
75 TABLET ORAL DAILY
Status: DISCONTINUED | OUTPATIENT
Start: 2024-05-27 | End: 2024-05-28 | Stop reason: HOSPADM

## 2024-05-26 RX ORDER — METHYLPREDNISOLONE SODIUM SUCCINATE 40 MG/ML
40 INJECTION, POWDER, LYOPHILIZED, FOR SOLUTION INTRAMUSCULAR; INTRAVENOUS EVERY 8 HOURS
Status: DISCONTINUED | OUTPATIENT
Start: 2024-05-26 | End: 2024-05-27

## 2024-05-26 RX ORDER — TAMSULOSIN HYDROCHLORIDE 0.4 MG/1
0.4 CAPSULE ORAL
Status: DISCONTINUED | OUTPATIENT
Start: 2024-05-26 | End: 2024-05-28 | Stop reason: HOSPADM

## 2024-05-26 RX ORDER — SODIUM CHLORIDE FOR INHALATION 0.9 %
3 VIAL, NEBULIZER (ML) INHALATION
Status: DISCONTINUED | OUTPATIENT
Start: 2024-05-26 | End: 2024-05-26

## 2024-05-26 RX ORDER — CLOPIDOGREL BISULFATE 75 MG/1
75 TABLET ORAL DAILY
COMMUNITY

## 2024-05-26 RX ORDER — CARVEDILOL 3.12 MG/1
3.12 TABLET ORAL 2 TIMES DAILY WITH MEALS
Status: DISCONTINUED | OUTPATIENT
Start: 2024-05-26 | End: 2024-05-26

## 2024-05-26 RX ORDER — ATORVASTATIN CALCIUM 20 MG/1
20 TABLET, FILM COATED ORAL DAILY
Status: DISCONTINUED | OUTPATIENT
Start: 2024-05-27 | End: 2024-05-28 | Stop reason: HOSPADM

## 2024-05-26 RX ORDER — LEVOTHYROXINE SODIUM 0.1 MG/1
200 TABLET ORAL DAILY
Status: DISCONTINUED | OUTPATIENT
Start: 2024-05-27 | End: 2024-05-28 | Stop reason: HOSPADM

## 2024-05-26 RX ORDER — IPRATROPIUM BROMIDE AND ALBUTEROL SULFATE 2.5; .5 MG/3ML; MG/3ML
3 SOLUTION RESPIRATORY (INHALATION) ONCE
Status: COMPLETED | OUTPATIENT
Start: 2024-05-26 | End: 2024-05-26

## 2024-05-26 RX ORDER — PREDNISONE 20 MG/1
40 TABLET ORAL DAILY
Qty: 10 TABLET | Refills: 0 | Status: SHIPPED | OUTPATIENT
Start: 2024-05-27 | End: 2024-06-01

## 2024-05-26 RX ORDER — METHYLPREDNISOLONE SODIUM SUCCINATE 125 MG/2ML
125 INJECTION, POWDER, LYOPHILIZED, FOR SOLUTION INTRAMUSCULAR; INTRAVENOUS ONCE
Status: COMPLETED | OUTPATIENT
Start: 2024-05-26 | End: 2024-05-26

## 2024-05-26 RX ORDER — CARVEDILOL 3.12 MG/1
3.12 TABLET ORAL 2 TIMES DAILY WITH MEALS
Status: DISCONTINUED | OUTPATIENT
Start: 2024-05-26 | End: 2024-05-28 | Stop reason: HOSPADM

## 2024-05-26 RX ORDER — CEFTRIAXONE 1 G/50ML
1000 INJECTION, SOLUTION INTRAVENOUS ONCE
Status: COMPLETED | OUTPATIENT
Start: 2024-05-26 | End: 2024-05-26

## 2024-05-26 RX ORDER — TAMSULOSIN HYDROCHLORIDE 0.4 MG/1
0.4 CAPSULE ORAL
Status: DISCONTINUED | OUTPATIENT
Start: 2024-05-26 | End: 2024-05-26

## 2024-05-26 RX ORDER — GUAIFENESIN 600 MG/1
600 TABLET, EXTENDED RELEASE ORAL 2 TIMES DAILY
Status: DISCONTINUED | OUTPATIENT
Start: 2024-05-26 | End: 2024-05-28 | Stop reason: HOSPADM

## 2024-05-26 RX ORDER — ACETAMINOPHEN 325 MG/1
650 TABLET ORAL EVERY 6 HOURS PRN
Status: DISCONTINUED | OUTPATIENT
Start: 2024-05-26 | End: 2024-05-28 | Stop reason: HOSPADM

## 2024-05-26 RX ORDER — ENOXAPARIN SODIUM 100 MG/ML
40 INJECTION SUBCUTANEOUS DAILY
Status: DISCONTINUED | OUTPATIENT
Start: 2024-05-27 | End: 2024-05-28 | Stop reason: HOSPADM

## 2024-05-26 RX ORDER — LEVALBUTEROL INHALATION SOLUTION 1.25 MG/3ML
1.25 SOLUTION RESPIRATORY (INHALATION)
Status: DISCONTINUED | OUTPATIENT
Start: 2024-05-26 | End: 2024-05-28 | Stop reason: HOSPADM

## 2024-05-26 RX ORDER — DOXYCYCLINE HYCLATE 100 MG/1
100 CAPSULE ORAL 2 TIMES DAILY
Qty: 14 CAPSULE | Refills: 0 | Status: SHIPPED | OUTPATIENT
Start: 2024-05-26 | End: 2024-06-02

## 2024-05-26 RX ORDER — OXYBUTYNIN CHLORIDE 5 MG/1
5 TABLET ORAL DAILY
Status: DISCONTINUED | OUTPATIENT
Start: 2024-05-26 | End: 2024-05-28 | Stop reason: HOSPADM

## 2024-05-26 RX ORDER — INSULIN LISPRO 100 [IU]/ML
2-12 INJECTION, SOLUTION INTRAVENOUS; SUBCUTANEOUS
Status: DISCONTINUED | OUTPATIENT
Start: 2024-05-26 | End: 2024-05-28 | Stop reason: HOSPADM

## 2024-05-26 RX ADMIN — METHYLPREDNISOLONE SODIUM SUCCINATE 40 MG: 40 INJECTION, POWDER, FOR SOLUTION INTRAMUSCULAR; INTRAVENOUS at 19:11

## 2024-05-26 RX ADMIN — OXYBUTYNIN CHLORIDE 5 MG: 5 TABLET ORAL at 21:23

## 2024-05-26 RX ADMIN — CEFTRIAXONE 1000 MG: 1 INJECTION, SOLUTION INTRAVENOUS at 15:20

## 2024-05-26 RX ADMIN — TAMSULOSIN HYDROCHLORIDE 0.4 MG: 0.4 CAPSULE ORAL at 21:23

## 2024-05-26 RX ADMIN — GUAIFENESIN 600 MG: 600 TABLET ORAL at 19:11

## 2024-05-26 RX ADMIN — IPRATROPIUM BROMIDE AND ALBUTEROL SULFATE 3 ML: 2.5; .5 SOLUTION RESPIRATORY (INHALATION) at 15:21

## 2024-05-26 RX ADMIN — CARVEDILOL 3.12 MG: 3.12 TABLET, FILM COATED ORAL at 21:23

## 2024-05-26 RX ADMIN — METHYLPREDNISOLONE SODIUM SUCCINATE 125 MG: 125 INJECTION, POWDER, FOR SOLUTION INTRAMUSCULAR; INTRAVENOUS at 15:19

## 2024-05-26 RX ADMIN — GABAPENTIN 100 MG: 100 CAPSULE ORAL at 21:23

## 2024-05-26 RX ADMIN — AZITHROMYCIN MONOHYDRATE 500 MG: 500 INJECTION, POWDER, LYOPHILIZED, FOR SOLUTION INTRAVENOUS at 19:12

## 2024-05-26 RX ADMIN — INSULIN LISPRO 2 UNITS: 100 INJECTION, SOLUTION INTRAVENOUS; SUBCUTANEOUS at 19:11

## 2024-05-26 RX ADMIN — LEVALBUTEROL HYDROCHLORIDE 1.25 MG: 1.25 SOLUTION RESPIRATORY (INHALATION) at 19:29

## 2024-05-26 NOTE — ASSESSMENT & PLAN NOTE
"Lab Results   Component Value Date    HGBA1C 6.7 (H) 12/01/2023       No results for input(s): \"POCGLU\" in the last 72 hours.    Blood Sugar Average: Last 72 hrs:  Update hemoglobin A1c  Hold home oral hypoglycemics  Diabetic diet  Insulin sliding scale  Continue gabapentin as prior for peripheral neuropathy    "

## 2024-05-26 NOTE — ASSESSMENT & PLAN NOTE
Patient with history of COPD, not on home oxygen.  Had a viral bronchitis earlier in the month, given a Medrol Dosepak initially, was follow-up with a short course of steroids for continued symptoms.  Patient reports continued symptoms over the past 2 weeks of shortness of breath and increased cough with thick sputum production.  -Associated hypoxia, currently on 2 L  -IV steroids with Solu-Medrol 40 mg 3 times daily  -Continue home daily inhaler, ATC xopenox and saline nebs  -Respiratory protocol  - Mucinex BID  -Incentive spirometer, flutter  -Mild leukocytosis, check procalcitonin  -Received ceftriaxone in the emergency department, will start azithromycin for antibiotic coverage, low suspicion associated bacterial pneumonia - pending procalcitonin will determine need for antibiotics

## 2024-05-26 NOTE — ED PROVIDER NOTES
"History  Chief Complaint   Patient presents with    Shortness of Breath     Pt states since April he has had sob and cough. Was dx with virus but getting worse. \"Feel like I'm drowning\" states intermittent chest pain. +productive cough.      Patient is a 84-year-old male with a history of COPD presenting emergency room with chief complaint of worsening cough and congestion.  Ongoing since April of this year.  Has been seen by PCP and was prescribed inhaler as well as steroids.  When that did not improve he was started on a course of 7 days worth of antibiotics.  Patient reports over the last several days he is feeling worsening shortness of breath.  He reported to triage nursing that he felt as if he was \"drowning.\"      History provided by:  Patient and spouse  Shortness of Breath  Severity:  Moderate  Onset quality:  Gradual  Duration:  2 months  Timing:  Constant  Associated symptoms: cough and sputum production    Associated symptoms: no chest pain, no diaphoresis, no fever, no vomiting and no wheezing        Prior to Admission Medications   Prescriptions Last Dose Informant Patient Reported? Taking?   Anoro Ellipta 62.5-25 MCG/INH inhaler   Yes No   Calcium Carbonate-Vitamin D (Calcium 600+D) 600-400 MG-UNIT per tablet   Yes No   Cholecalciferol (Vitamin D-3) 25 MCG (1000 UT) CAPS   Yes No   Sig: Take by mouth   Coenzyme Q10 (CoQ10) 100 MG CAPS   Yes No   Sig: Take by mouth   Flomax 0.4 MG   Yes No   Januvia 100 MG tablet   Yes No   L-Methylfolate-B6-B12 (FOLTX PO)   Yes No   Sig: Take by mouth   Lancets (freestyle) lancets   Yes No   Sig: FreeStyle Lancets 28 gauge   Polyethylene Glycol 3350 GRAN   Yes No   Sig: by Does not apply route   Synthroid 175 MCG tablet   Yes No   albuterol (ProAir HFA) 90 mcg/act inhaler   Yes No   Sig: Inhale 2 puffs every 6 (six) hours as needed for wheezing   aspirin (Aspirin Adult Low Dose) 81 mg EC tablet   Yes No   Sig: Take by mouth   atorvastatin (LIPITOR) 20 mg tablet   Yes " No   carvedilol (COREG) 3.125 mg tablet   Yes No   Sig: TAKE ONE TABLET BY MOUTH TWICE A DAY WITH MORNING AND EVENING MEALS   denosumab (PROLIA) 60 mg/mL   Yes No   Sig: Inject under the skin   gabapentin (NEURONTIN) 100 mg capsule   No No   Sig: Take 1 capsule (100 mg total) by mouth 3 (three) times a day   glimepiride (AMARYL) 1 mg tablet   Yes No   glucose blood test strip   Yes No   Sig: FreeStyle Lite Strips   montelukast (SINGULAIR) 10 mg tablet   Yes No   Sig: Take 10 mg by mouth daily   oxybutynin (DITROPAN) 5 mg tablet   Yes No   sertraline (ZOLOFT) 50 mg tablet   Yes No      Facility-Administered Medications: None       Past Medical History:   Diagnosis Date    AAA (abdominal aortic aneurysm) ([facility])     Arthritis     Chronic kidney disease     Chronic pain disorder     low back    Colon polyp     COPD (chronic obstructive pulmonary disease) (HCC)     CPAP (continuous positive airway pressure) dependence     Diabetes (HCC)     Diabetes mellitus (HCC)     Disease of thyroid gland     Dyslipidemia     MARY (generalized anxiety disorder)     GERD (gastroesophageal reflux disease)     Hyperlipidemia     Hypertension     Obesity     Osteoporosis     Shortness of breath     Sleep apnea     TIA (transient ischemic attack)     Wears dentures        Past Surgical History:   Procedure Laterality Date    BACK SURGERY  07/2009    COLONOSCOPY      EPIDURAL BLOCK INJECTION N/A 9/15/2020    Procedure: L5 S1 Lumbar Epidural Steroid Injection (96363);  Surgeon: Truong Triana MD;  Location: [facility];  Service: Pain Management     FL GUIDED NEEDLE PLAC BX/ASP/INJ  9/15/2020    FRACTURE SURGERY      stress fracture of pelvis    LEG SURGERY         Family History   Problem Relation Age of Onset    Diabetes Mother     Osteoporosis Mother     Heart disease Father      I have reviewed and agree with the history as documented.    E-Cigarette/Vaping    E-Cigarette Use Never User      E-Cigarette/Vaping Substances     Social History      Tobacco Use    Smoking status: Former     Current packs/day: 0.00     Types: Cigarettes     Quit date: 9/15/2007     Years since quittin.7    Smokeless tobacco: Never   Vaping Use    Vaping status: Never Used   Substance Use Topics    Alcohol use: Yes     Comment: rarely    Drug use: Never       Review of Systems   Constitutional:  Negative for diaphoresis and fever.   HENT:  Positive for congestion.    Respiratory:  Positive for cough, sputum production and shortness of breath. Negative for chest tightness and wheezing.    Cardiovascular:  Negative for chest pain.   Gastrointestinal:  Negative for diarrhea, nausea and vomiting.       Physical Exam  Physical Exam  Vitals and nursing note reviewed.   Constitutional:       General: He is in acute distress.      Appearance: He is normal weight. He is not ill-appearing or toxic-appearing.   HENT:      Head: Normocephalic and atraumatic.      Right Ear: External ear normal.      Left Ear: External ear normal.      Nose: Nose normal.      Mouth/Throat:      Mouth: Mucous membranes are moist.   Eyes:      Pupils: Pupils are equal, round, and reactive to light.   Pulmonary:      Effort: Pulmonary effort is normal. Tachypnea present. No respiratory distress.      Breath sounds: Decreased breath sounds and rhonchi present. No wheezing or rales.   Abdominal:      General: Abdomen is flat. There is no distension.   Musculoskeletal:         General: No signs of injury.   Skin:     Coloration: Skin is not pale.   Neurological:      General: No focal deficit present.      Mental Status: He is alert.   Psychiatric:         Mood and Affect: Mood normal.         Thought Content: Thought content normal.         Judgment: Judgment normal.         Vital Signs  ED Triage Vitals   Temperature Pulse Respirations Blood Pressure SpO2   24 1327 24 1319 24 1319 24 1319 24 1319   (!) 97 °F (36.1 °C) 90 (!) 27 (!) 215/100 (!) 88 %      Temp Source Heart  Rate Source Patient Position - Orthostatic VS BP Location FiO2 (%)   05/26/24 1327 -- -- -- --   Temporal          Pain Score       05/26/24 1319       No Pain           Vitals:    05/26/24 1319 05/26/24 1344   BP: (!) 215/100 147/74   Pulse: 90          Visual Acuity      ED Medications  Medications   cefTRIAXone (ROCEPHIN) IVPB (premix in dextrose) 1,000 mg 50 mL (1,000 mg Intravenous New Bag 5/26/24 1520)   ipratropium-albuterol (DUO-NEB) 0.5-2.5 mg/3 mL inhalation solution 3 mL (3 mL Nebulization Given 5/26/24 1521)   methylPREDNISolone sodium succinate (Solu-MEDROL) injection 125 mg (125 mg Intravenous Given 5/26/24 1519)       Diagnostic Studies  Results Reviewed       Procedure Component Value Units Date/Time    FLU/RSV/COVID - if FLU/RSV clinically relevant [[id number]]  (Normal) Collected: 05/26/24 1358    Lab Status: Final result Specimen: Nares from Nose Updated: 05/26/24 1442     SARS-CoV-2 Negative     INFLUENZA A PCR Negative     INFLUENZA B PCR Negative     RSV PCR Negative    Narrative:      FOR PEDIATRIC PATIENTS - copy/paste COVID Guidelines URL to browser: [email or web address]    SARS-CoV-2 assay is a Nucleic Acid Amplification assay intended for the  qualitative detection of nucleic acid from SARS-CoV-2 in nasopharyngeal  swabs. Results are for the presumptive identification of SARS-CoV-2 RNA.    Positive results are indicative of infection with SARS-CoV-2, the virus  causing COVID-19, but do not rule out bacterial infection or co-infection  with other viruses. Laboratories within the [place] and its  territories are required to report all positive results to the appropriate  public health authorities. Negative results do not preclude SARS-CoV-2  infection and should not be used as the sole basis for treatment or other  patient management decisions. Negative results must be combined with  clinical observations, patient history, and  epidemiological information.  This test has not been FDA cleared or approved.    This test has been authorized by FDA under an Emergency Use Authorization  (EUA). This test is only authorized for the duration of time the  declaration that circumstances exist justifying the authorization of the  emergency use of an in vitro diagnostic tests for detection of SARS-CoV-2  virus and/or diagnosis of COVID-19 infection under section 564(b)(1) of  the Act, 21 U.S.C. 360bbb-3(b)(1), unless the authorization is terminated  or revoked sooner. The test has been validated but independent review by FDA  and CLIA is pending.    Test performed using [employer] GeneXpert: This RT-PCR assay targets N2,  a region unique to SARS-CoV-2. A conserved region in the E-gene was chosen  for pan-Sarbecovirus detection which includes SARS-CoV-2.    According to CMS-2020-01-R, this platform meets the definition of high-throughput technology.    Blood culture #1 [[id number]] Collected: 05/26/24 1432    Lab Status: In process Specimen: Blood from Arm, Left Updated: 05/26/24 1434    Lactic acid, plasma (w/reflex if result > 2.0) [[id number]]  (Normal) Collected: 05/26/24 1358    Lab Status: Final result Specimen: Blood from Arm, Right Updated: 05/26/24 1424     LACTIC ACID 1.0 mmol/L     Narrative:      Result may be elevated if tourniquet was used during collection.    HS Troponin I 2hr [[id number]]     Lab Status: No result Specimen: Blood     HS Troponin 0hr (reflex protocol) [[id number]]  (Normal) Collected: 05/26/24 1340    Lab Status: Final result Specimen: Blood from Arm, Right Updated: 05/26/24 1411     hs TnI 0hr 20 ng/L     D-Dimer [[id number]]  (Abnormal) Collected: 05/26/24 1340    Lab Status: Final result Specimen: Blood from Arm, Right Updated: 05/26/24 1409     D-Dimer, Quant 0.56 ug/ml FEU     Narrative:      In the evaluation for possible pulmonary embolism, in the appropriate (Well's Score of 4 or less) patient, the age adjusted d-dimer  cutoff for this patient can be calculated as:    Age x 0.01 (in ug/mL) for Age-adjusted D-dimer exclusion threshold for a patient over 50 years.    B-Type Natriuretic Peptide(BNP) [[id number]]  (Normal) Collected: 05/26/24 1340    Lab Status: Final result Specimen: Blood from Arm, Right Updated: 05/26/24 1409     BNP 65 pg/mL     Comprehensive metabolic panel [[id number]]  (Abnormal) Collected: 05/26/24 1340    Lab Status: Final result Specimen: Blood from Arm, Right Updated: 05/26/24 1407     Sodium 133 mmol/L      Potassium 4.3 mmol/L      Chloride 100 mmol/L      CO2 24 mmol/L      ANION GAP 9 mmol/L      BUN 15 mg/dL      Creatinine 1.10 mg/dL      Glucose 134 mg/dL      Calcium 9.4 mg/dL      AST 23 U/L      ALT 14 U/L      Alkaline Phosphatase 76 U/L      Total Protein 8.2 g/dL      Albumin 4.2 g/dL      Total Bilirubin 0.66 mg/dL      eGFR 62 ml/min/1.73sq m     Narrative:      National Kidney Disease Foundation guidelines for Chronic Kidney Disease (CKD):     Stage 1 with normal or high GFR (GFR > 90 mL/min/1.73 square meters)    Stage 2 Mild CKD (GFR = 60-89 mL/min/1.73 square meters)    Stage 3A Moderate CKD (GFR = 45-59 mL/min/1.73 square meters)    Stage 3B Moderate CKD (GFR = 30-44 mL/min/1.73 square meters)    Stage 4 Severe CKD (GFR = 15-29 mL/min/1.73 square meters)    Stage 5 End Stage CKD (GFR <15 mL/min/1.73 square meters)  Note: GFR calculation is accurate only with a steady state creatinine    Blood culture #2 [[id number]] Collected: 05/26/24 1358    Lab Status: In process Specimen: Blood from Arm, Right Updated: 05/26/24 1403    CBC and differential [[id number]]  (Abnormal) Collected: 05/26/24 1340    Lab Status: Final result Specimen: Blood from Arm, Right Updated: 05/26/24 1345     WBC 10.71 Thousand/uL      RBC 3.91 Million/uL      Hemoglobin 12.0 g/dL      Hematocrit 35.5 %      MCV 91 fL      MCH 30.7 pg      MCHC 33.8 g/dL      RDW 13.6 %      MPV 9.9 fL      Platelets 245 Thousands/uL       nRBC 0 /100 WBCs      Segmented % 60 %      Immature Grans % 1 %      Lymphocytes % 22 %      Monocytes % 10 %      Eosinophils Relative 7 %      Basophils Relative 0 %      Absolute Neutrophils 6.47 Thousands/µL      Absolute Immature Grans 0.05 Thousand/uL      Absolute Lymphocytes 2.30 Thousands/µL      Absolute Monocytes 1.10 Thousand/µL      Eosinophils Absolute 0.76 Thousand/µL      Basophils Absolute 0.03 Thousands/µL                    XR chest 1 view portable   Final Result by Obie Schroeder MD (05/26 1514)      Large lungs suggestive of COPD. Prominent pulmonary vascularity which may be accentuated by portable technique. Consider PA and lateral radiograph when the patient is able.         I personally discussed this study with EDDIE VERMA on 5/26/2024 3:13 PM.                  Workstation performed: [id number]                    Procedures  ECG 12 Lead Documentation Only    Date/Time: 5/26/2024 1:57 PM    Performed by: Eddie Verma DO  Authorized by: Eddie Verma DO    ECG reviewed by me, the ED Provider: yes    Patient location:  ED  Previous ECG:     Previous ECG:  Unavailable  Interpretation:     Interpretation: normal    Rate:     ECG rate assessment: normal    Rhythm:     Rhythm: sinus rhythm    Ectopy:     Ectopy: PVCs    QRS:     QRS axis:  Normal  Conduction:     Conduction: abnormal      Abnormal conduction: 1st degree    ST segments:     ST segments:  Non-specific  T waves:     T waves: non-specific             ED Course  ED Course as of 05/26/24 1526   Sun May 26, 2024   1414 D-Dimer, Quant(!): 0.56  Age-adjusted D-dimer is negative.  Years algorithm with D-dimer well below 1000 places patient only 0.43% risk of PE   1503 Attempted to ambulate patient.  Patient desaturates to 87%.   1506 Patient placed on 2 L of nasal cannula.   1511 BNP: 65   1512 Discussed with radiology.  Reports pulmonary vasculature are slightly prominent.   1521 Discussed findings with patient.  Patient  does desaturate with ambulation.  Discussed the possibility of admission to the patient with the patient.  Patient is reluctant to be admitted.  Will reassess after treatment.  If he does decide to go home will prescribe antibiotics and steroids.  Patient has albuterol at home.   1524 Will sign out to Dr. Davenport.                               SBIRT 20yo+      Flowsheet Row Most Recent Value   Initial Alcohol Screen: US AUDIT-C     1. How often do you have a drink containing alcohol? 0 Filed at: 05/26/2024 1333   2. How many drinks containing alcohol do you have on a typical day you are drinking?  0 Filed at: 05/26/2024 1333   3a. Male UNDER 65: How often do you have five or more drinks on one occasion? 0 Filed at: 05/26/2024 1321   3b. FEMALE Any Age, or MALE 65+: How often do you have 4 or more drinks on one occassion? 0 Filed at: 05/26/2024 1333   Audit-C Score 0 Filed at: 05/26/2024 1333   DEAN: How many times in the past year have you...    Used an illegal drug or used a prescription medication for non-medical reasons? Never Filed at: 05/26/2024 1333                      Medical Decision Making  Patient presented to the emergency department and a MSE was performed. The patient was evaluated for complaint related to acute shortness of breath.  Patient is potentially at risk for, but not limited to, acute coronary syndrome, arrhythmia, myocardial infarction, pulmonary embolism, pneumothorax, infectious process of the lungs such as pneumonia, reactive airway disease, asthma, COPD exacerbation, cardiomyopathy, congestive heart failure or viral syndrome.  Several of these diagnoses have been evaluated and ruled out by history and physical.  As needed, patient will be further evaluated with laboratory and imaging studies.  Higher level diagnostics, such as CT imaging or ultrasound, may also be required.  Please see work-up portion of the note for further evaluation of patient's risk.  Socioeconomic factors were  also considered as part of the decision-making process.  Unless otherwise stated in the chart or patient is admitted as elsewhere documented, any previously prescribed medications will be maintained.      Problems Addressed:  COPD with acute exacerbation (HCC): chronic illness or injury with exacerbation, progression, or side effects of treatment     Details: Have discussed the possibility of admission with the patient.  Patient is reluctant to stay in the hospital.  Patient reports that he would likely want to be discharged home.  Patient understands the risk associated with being discharged home which includes respiratory failure which could be life ending.    Discussed with patient that we would reassess him following albuterol and steroid therapy    Amount and/or Complexity of Data Reviewed  Labs: ordered. Decision-making details documented in ED Course.  Radiology: ordered.    Risk  Prescription drug management.  Decision regarding hospitalization.             Disposition  Final diagnoses:   Dyspnea   Hypoxia - 87%   COPD with acute exacerbation (HCC)     Time reflects when diagnosis was documented in both MDM as applicable and the Disposition within this note       Time User Action Codes Description Comment    5/26/2024  3:05 PM Eddie Verma Add [R06.00] Dyspnea     5/26/2024  3:05 PM Eddie Verma Add [R09.02] Hypoxia     5/26/2024  3:05 PM Eddie Verma Add [J44.1] COPD with acute exacerbation (HCC)     5/26/2024  3:06 PM Eddie Verma Modify [R09.02] Hypoxia 87%          ED Disposition       ED Disposition   Admit    Condition   Stable    Date/Time   Sun May 26, 2024 150    Comment                  Follow-up Information       Follow up With Specialties Details Why Contact Info    Ruma Joshi MD Family Medicine In 1 week  [address] [facility] [address] [phone]              Patient's Medications   Discharge Prescriptions    DOXYCYCLINE HYCLATE (VIBRAMYCIN) 100 MG CAPSULE    Take 1  capsule (100 mg total) by mouth 2 (two) times a day for 7 days       Start Date: 5/26/2024 End Date: 6/2/2024       Order Dose: 100 mg       Quantity: 14 capsule    Refills: 0    PREDNISONE 20 MG TABLET    Take 2 tablets (40 mg total) by mouth daily for 5 days Do not start before May 27, 2024.       Start Date: 5/27/2024 End Date: 6/1/2024       Order Dose: 40 mg       Quantity: 10 tablet    Refills: 0       No discharge procedures on file.    PDMP Review       None            ED Provider  Electronically Signed by             Eddie Verma DO  05/26/24 5869

## 2024-05-26 NOTE — ED NOTES
Patient attempted to ambulate on RA. Patient's SpO2 87% on RA with exertion. Provider notified of the same.      Tierney Davey RN  05/26/24 0498

## 2024-05-26 NOTE — RESPIRATORY THERAPY NOTE
RT Protocol Note  Joseph Nath 84 y.o. male MRN: [id number]  Unit/Bed#: -01 Encounter: [id number]    Assessment    Principal Problem:    Chronic obstructive pulmonary disease with acute exacerbation (HCC)  Active Problems:    Type 2 diabetes mellitus with diabetic neuropathy, without long-term current use of insulin (HCC)    Primary hypertension    Peripheral arterial disease (HCC)    RAJI (obstructive sleep apnea)    History of prostate cancer      Home Pulmonary Medications:  Anoro Daily PRN albuterol       Past Medical History:   Diagnosis Date    AAA (abdominal aortic aneurysm) (HCC)     Arthritis     Chronic kidney disease     Chronic pain disorder     low back    Colon polyp     COPD (chronic obstructive pulmonary disease) (HCC)     CPAP (continuous positive airway pressure) dependence     Diabetes (HCC)     Diabetes mellitus (HCC)     Disease of thyroid gland     Dyslipidemia     MARY (generalized anxiety disorder)     GERD (gastroesophageal reflux disease)     Hyperlipidemia     Hypertension     Obesity     Osteoporosis     Shortness of breath     Sleep apnea     TIA (transient ischemic attack)     Wears dentures      Social History     Socioeconomic History    Marital status: /Civil Union     Spouse name: None    Number of children: None    Years of education: None    Highest education level: None   Occupational History    None   Tobacco Use    Smoking status: Former     Current packs/day: 0.00     Types: Cigarettes     Quit date: 9/15/2007     Years since quittin.7    Smokeless tobacco: Never   Vaping Use    Vaping status: Never Used   Substance and Sexual Activity    Alcohol use: Not Currently     Comment: rarely    Drug use: Never    Sexual activity: None   Other Topics Concern    None   Social History Narrative    None     Social Determinants of Health     Financial Resource Strain: Not on file   Food Insecurity: Patient Declined (2023)    Received from [facility]    Hunger Vital  Sign     Worried About Running Out of Food in the Last Year: Patient declined     Ran Out of Food in the Last Year: Patient declined   Transportation Needs: Not on file   Physical Activity: Not on file   Stress: Not on file   Social Connections: Not on file   Intimate Partner Violence: Not on file   Housing Stability: Not on file       Subjective         Objective    Physical Exam:   Assessment Type: (P) Assess only  General Appearance: (P) Awake  Respiratory Pattern: (P) Normal  Chest Assessment: (P) Chest expansion symmetrical  Bilateral Breath Sounds: (P) Diminished  Cough: (P) Productive, Congested    Vitals:  Blood pressure 143/89, pulse 81, temperature (!) 97.2 °F (36.2 °C), resp. rate 18, height 6' (1.829 m), weight 113 kg (249 lb), SpO2 94%.          Imaging and other studies: I have personally reviewed pertinent reports.            Plan    Respiratory Plan: (P) Mild Distress pathway  Airway Clearance Plan: (P) Incentive Spirometer     Resp Comments: (P) Pt admitted due to COPD, pt does follow with pulmonary, takes Anoro daily and prn albuterol. No home O2 was on2 L NC. C/o sob at arrivial no respiratory distress during assessment Will cont with TID treatments at this time.

## 2024-05-26 NOTE — H&P
"[facility]  H&P  Name: Joseph Nath 84 y.o. male I MRN: [id number]  Unit/Bed#: -01 I Date of Admission: 5/26/2024   Date of Service: 5/26/2024 I Hospital Day: 0      Assessment & Plan   * Chronic obstructive pulmonary disease with acute exacerbation (HCC)  Assessment & Plan  Patient with history of COPD, not on home oxygen.  Had a viral bronchitis earlier in the month, given a Medrol Dosepak initially, was follow-up with a short course of steroids for continued symptoms.  Patient reports continued symptoms over the past 2 weeks of shortness of breath and increased cough with thick sputum production.  -Associated hypoxia, currently on 2 L  -IV steroids with Solu-Medrol 40 mg 3 times daily  -Continue home daily inhaler, ATC xopenox and saline nebs  -Respiratory protocol  - Mucinex BID  -Incentive spirometer, flutter  -Mild leukocytosis, check procalcitonin  -Received ceftriaxone in the emergency department, will start azithromycin for antibiotic coverage, low suspicion associated bacterial pneumonia - pending procalcitonin will determine need for antibiotics    History of prostate cancer  Assessment & Plan  Continue oxybutynin  Vesicare and mytrebiq not on formulary    RAJI (obstructive sleep apnea)  Assessment & Plan  Continue nightly CPAP    Peripheral arterial disease (HCC)  Assessment & Plan  Patient has reported arterial stent in left due to aneurysm per patient  Continue plavix    Primary hypertension  Assessment & Plan  Continue carvedilol    Type 2 diabetes mellitus with diabetic neuropathy, without long-term current use of insulin (HCC)  Assessment & Plan  Lab Results   Component Value Date    HGBA1C 6.7 (H) 12/01/2023       No results for input(s): \"POCGLU\" in the last 72 hours.    Blood Sugar Average: Last 72 hrs:  Update hemoglobin A1c  Hold home oral hypoglycemics  Diabetic diet  Insulin sliding scale  Continue gabapentin as prior for peripheral neuropathy         "     VTE Pharmacologic Prophylaxis:   Moderate Risk (Score 3-4) - Pharmacological DVT Prophylaxis Ordered: enoxaparin (Lovenox).  Code Status: Level 1 - Full Code   Discussion with family: Updated  (wife) at bedside.    Anticipated Length of Stay: Patient will be admitted on an inpatient basis with an anticipated length of stay of greater than 2 midnights secondary to copd exacerbation.    Total Time Spent on Date of Encounter in care of patient: 45 mins. This time was spent on one or more of the following: performing physical exam; counseling and coordination of care; obtaining or reviewing history; documenting in the medical record; reviewing/ordering tests, medications or procedures; communicating with other healthcare professionals and discussing with patient's family/caregivers.    Chief Complaint: sob    History of Present Illness:  Joseph Nath is a 84 y.o. male with a PMH of COPD, sleep apnea, diabetes, hypothyroidism, hypertension, hyperlipidemia who presents with shortness of breath.  Patient reports he has felt more short of breath recently, some variable information on exactly when his symptoms started.  He saw his primary care provider earlier this month and was diagnosed with a viral bronchitis and treated with a Solu-Medrol pack.  He states that he did not get significant symptom relief from that treatment, and then was subsequently treated with a course of steroids and also did not feel significant improvement.  He notes he really noticed more significant shortness of breath over the past 2 weeks, associate with increased sputum production and a persistent cough.  Doing activities that he is typically used to and becoming short of breath, such as walking up a flight of stairs, or outside mowing.  Denies any fevers or chills at home.  Patient admitted to medicine for further management    Review of Systems:  Review of Systems   Constitutional:  Negative for chills and fever.   HENT:   Negative for ear pain and sore throat.    Eyes:  Negative for pain and visual disturbance.   Respiratory:  Positive for cough, shortness of breath and wheezing.    Cardiovascular:  Negative for chest pain and palpitations.   Gastrointestinal:  Negative for abdominal pain and vomiting.   Genitourinary:  Negative for dysuria and hematuria.   Musculoskeletal:  Negative for arthralgias and back pain.   Skin:  Negative for color change and rash.   Neurological:  Negative for seizures and syncope.   All other systems reviewed and are negative.      Past Medical and Surgical History:   Past Medical History:   Diagnosis Date    AAA (abdominal aortic aneurysm) ([facility])     Arthritis     Chronic kidney disease     Chronic pain disorder     low back    Colon polyp     COPD (chronic obstructive pulmonary disease) (HCC)     CPAP (continuous positive airway pressure) dependence     Diabetes (HCC)     Diabetes mellitus (HCC)     Disease of thyroid gland     Dyslipidemia     MARY (generalized anxiety disorder)     GERD (gastroesophageal reflux disease)     Hyperlipidemia     Hypertension     Obesity     Osteoporosis     Shortness of breath     Sleep apnea     TIA (transient ischemic attack)     Wears dentures        Past Surgical History:   Procedure Laterality Date    BACK SURGERY  07/2009    COLONOSCOPY      EPIDURAL BLOCK INJECTION N/A 9/15/2020    Procedure: L5 S1 Lumbar Epidural Steroid Injection (23622);  Surgeon: Truong Triana MD;  Location: [facility];  Service: Pain Management     FL GUIDED NEEDLE PLAC BX/ASP/INJ  9/15/2020    FRACTURE SURGERY      stress fracture of pelvis    LEG SURGERY         Meds/Allergies:  Prior to Admission medications    Medication Sig Start Date End Date Taking? Authorizing Provider   Anoro Ellipta 62.5-25 MCG/INH inhaler Inhale 1 puff daily 7/5/20  Yes Historical Provider, MD   atorvastatin (LIPITOR) 20 mg tablet Take 20 mg by mouth daily 6/18/20  Yes Historical Provider, MD   carvedilol (COREG)  3.125 mg tablet Take 3.125 mg by mouth 2 (two) times a day with meals 12/19/19 5/26/24 Yes Historical Provider, MD   Cholecalciferol (Vitamin D-3) 25 MCG (1000 UT) CAPS Take by mouth   Yes Historical Provider, MD   clopidogrel (PLAVIX) 75 mg tablet Take 75 mg by mouth daily   Yes Historical Provider, MD   doxycycline hyclate (VIBRAMYCIN) 100 mg capsule Take 1 capsule (100 mg total) by mouth 2 (two) times a day for 7 days 5/26/24 6/2/24 Yes Eddie Verma DO   Flomax 0.4 MG Take 0.4 mg by mouth daily with dinner 6/18/20  Yes Historical Provider, MD   oxybutynin (DITROPAN) 5 mg tablet Take 5 mg by mouth in the morning 8/10/20  Yes Historical Provider, MD   predniSONE 20 mg tablet Take 2 tablets (40 mg total) by mouth daily for 5 days Do not start before May 27, 2024. 5/27/24 6/1/24 Yes Eddie Vemra DO   Synthroid 175 MCG tablet Take 200 mcg by mouth daily 6/18/20  Yes Historical Provider, MD   albuterol (ProAir HFA) 90 mcg/act inhaler Inhale 2 puffs every 6 (six) hours as needed for wheezing  5/26/24 Yes Historical Provider, MD   Coenzyme Q10 (CoQ10) 100 MG CAPS Take by mouth  5/26/24 Yes Historical Provider, MD   glimepiride (AMARYL) 1 mg tablet Take 1 mg by mouth daily with breakfast 8/10/20 5/26/24 Yes Historical Provider, MD   Januvia 100 MG tablet  8/10/20 5/26/24 Yes Historical Provider, MD   Myrbetriq 25 MG TB24 Take 25 mg by mouth in the morning 5/9/24 5/26/24 Yes Historical Provider, MD   solifenacin (VESICARE) 5 mg tablet Take 5 mg by mouth daily  5/26/24 Yes Historical Provider, MD   gabapentin (NEURONTIN) 100 mg capsule Take 1 capsule (100 mg total) by mouth 3 (three) times a day  Patient taking differently: Take 100 mg by mouth daily at bedtime 8/31/20   Truong Triana MD   Lancets (freestyle) lancets FreeStyle Lancets 28 gauge    Historical Provider, MD   sertraline (ZOLOFT) 50 mg tablet Take 50 mg by mouth daily 6/18/20   Historical Provider, MD   aspirin (Aspirin Adult Low Dose) 81 mg EC  tablet Take by mouth  Patient not taking: Reported on 2024  Historical Provider, MD   Calcium Carbonate-Vitamin D (Calcium 600+D) 600-400 MG-UNIT per tablet   24  Historical Provider, MD   denosumab (PROLIA) 60 mg/mL Inject under the skin  24  Historical Provider, MD   glucose blood test strip FreeStyle Lite Strips  24  Historical Provider, MD   L-Methylfolate-B6-B12 (FOLTX PO) Take by mouth  Patient not taking: Reported on 2024  Historical Provider, MD   montelukast (SINGULAIR) 10 mg tablet Take 10 mg by mouth daily  Patient not taking: Reported on 2024 7/3/20 5/26/24  Historical Provider, MD   Polyethylene Glycol 3350 GRAN by Does not apply route  24  Historical Provider, MD     I have reviewed home medications with patient personally.    Allergies:   Allergies   Allergen Reactions    Oxycodone Anxiety     agitation       Social History:  Marital Status: /Civil Union   Occupation: n/a  Patient Pre-hospital Living Situation: Home  Patient Pre-hospital Level of Mobility: walks  Patient Pre-hospital Diet Restrictions: diabetic  Substance Use History:   Social History     Substance and Sexual Activity   Alcohol Use Not Currently    Comment: rarely     Social History     Tobacco Use   Smoking Status Former    Current packs/day: 0.00    Types: Cigarettes    Quit date: 9/15/2007    Years since quittin.7   Smokeless Tobacco Never     Social History     Substance and Sexual Activity   Drug Use Never       Family History:  Family History   Problem Relation Age of Onset    Diabetes Mother     Osteoporosis Mother     Heart disease Father        Physical Exam:     Vitals:   Blood Pressure: 143/89 (24 1711)  Pulse: 81 (24 171)  Temperature: (!) 97.2 °F (36.2 °C) (24)  Temp Source: Temporal (24 1327)  Respirations: 18 (24)  Height: 6' (182.9 cm) (24 1649)  Weight - Scale: 113 kg (249 lb 1.9 oz) (24 1319)  SpO2:  94 % (05/26/24 1711)    Physical Exam  Vitals and nursing note reviewed.   Constitutional:       General: He is not in acute distress.     Appearance: He is well-developed.   HENT:      Head: Normocephalic and atraumatic.   Eyes:      Conjunctiva/sclera: Conjunctivae normal.   Cardiovascular:      Rate and Rhythm: Normal rate and regular rhythm.      Heart sounds: No murmur heard.  Pulmonary:      Effort: Pulmonary effort is normal. No respiratory distress.      Comments: Reduced breath sounds bilaterally without any significant notable wheeze, aeration worse in the bases bilaterally  Abdominal:      Palpations: Abdomen is soft.      Tenderness: There is no abdominal tenderness.   Musculoskeletal:         General: No swelling.      Cervical back: Neck supple.   Skin:     General: Skin is warm and dry.      Capillary Refill: Capillary refill takes less than 2 seconds.   Neurological:      Mental Status: He is alert.   Psychiatric:         Mood and Affect: Mood normal.          Additional Data:     Lab Results:  Results from last 7 days   Lab Units 05/26/24  1340   WBC Thousand/uL 10.71*   HEMOGLOBIN g/dL 12.0   HEMATOCRIT % 35.5*   PLATELETS Thousands/uL 245   SEGS PCT % 60   LYMPHO PCT % 22   MONO PCT % 10   EOS PCT % 7*     Results from last 7 days   Lab Units 05/26/24  1340   SODIUM mmol/L 133*   POTASSIUM mmol/L 4.3   CHLORIDE mmol/L 100   CO2 mmol/L 24   BUN mg/dL 15   CREATININE mg/dL 1.10   ANION GAP mmol/L 9   CALCIUM mg/dL 9.4   ALBUMIN g/dL 4.2   TOTAL BILIRUBIN mg/dL 0.66   ALK PHOS U/L 76   ALT U/L 14   AST U/L 23   GLUCOSE RANDOM mg/dL 134                 Results from last 7 days   Lab Units 05/26/24  1358   LACTIC ACID mmol/L 1.0       Lines/Drains:  Invasive Devices       Peripheral Intravenous Line  Duration             Peripheral IV 05/26/24 Right Antecubital <1 day                        Imaging: Reviewed radiology reports from this admission including: chest xray  XR chest 1 view portable   Final  Result by Obie Schroeder MD (05/26 1514)      Large lungs suggestive of COPD. Prominent pulmonary vascularity which may be accentuated by portable technique. Consider PA and lateral radiograph when the patient is able.         I personally discussed this study with ASHLEY ALANIS on 5/26/2024 3:13 PM.                  Workstation performed: [id number]             ** Please Note: This note has been constructed using a voice recognition system. **

## 2024-05-27 LAB
ANION GAP SERPL CALCULATED.3IONS-SCNC: 6 MMOL/L (ref 4–13)
BUN SERPL-MCNC: 20 MG/DL (ref 5–25)
CALCIUM SERPL-MCNC: 8.5 MG/DL (ref 8.4–10.2)
CHLORIDE SERPL-SCNC: 101 MMOL/L (ref 96–108)
CO2 SERPL-SCNC: 24 MMOL/L (ref 21–32)
CREAT SERPL-MCNC: 1.05 MG/DL (ref 0.6–1.3)
ERYTHROCYTE [DISTWIDTH] IN BLOOD BY AUTOMATED COUNT: 13.4 % (ref 11.6–15.1)
GFR SERPL CREATININE-BSD FRML MDRD: 65 ML/MIN/1.73SQ M
GLUCOSE SERPL-MCNC: 160 MG/DL (ref 65–140)
GLUCOSE SERPL-MCNC: 166 MG/DL (ref 65–140)
GLUCOSE SERPL-MCNC: 175 MG/DL (ref 65–140)
GLUCOSE SERPL-MCNC: 247 MG/DL (ref 65–140)
HCT VFR BLD AUTO: 32 % (ref 36.5–49.3)
HGB BLD-MCNC: 10.7 G/DL (ref 12–17)
MCH RBC QN AUTO: 30 PG (ref 26.8–34.3)
MCHC RBC AUTO-ENTMCNC: 33.4 G/DL (ref 31.4–37.4)
MCV RBC AUTO: 90 FL (ref 82–98)
PLATELET # BLD AUTO: 210 THOUSANDS/UL (ref 149–390)
PMV BLD AUTO: 9.9 FL (ref 8.9–12.7)
POTASSIUM SERPL-SCNC: 4.4 MMOL/L (ref 3.5–5.3)
RBC # BLD AUTO: 3.57 MILLION/UL (ref 3.88–5.62)
SODIUM SERPL-SCNC: 131 MMOL/L (ref 135–147)
WBC # BLD AUTO: 8.93 THOUSAND/UL (ref 4.31–10.16)

## 2024-05-27 PROCEDURE — 82948 REAGENT STRIP/BLOOD GLUCOSE: CPT

## 2024-05-27 PROCEDURE — 94640 AIRWAY INHALATION TREATMENT: CPT

## 2024-05-27 PROCEDURE — 80048 BASIC METABOLIC PNL TOTAL CA: CPT | Performed by: HOSPITALIST

## 2024-05-27 PROCEDURE — 85027 COMPLETE CBC AUTOMATED: CPT | Performed by: HOSPITALIST

## 2024-05-27 PROCEDURE — 94760 N-INVAS EAR/PLS OXIMETRY 1: CPT

## 2024-05-27 PROCEDURE — 94664 DEMO&/EVAL PT USE INHALER: CPT

## 2024-05-27 PROCEDURE — 99233 SBSQ HOSP IP/OBS HIGH 50: CPT | Performed by: HOSPITALIST

## 2024-05-27 RX ORDER — METHYLPREDNISOLONE SODIUM SUCCINATE 40 MG/ML
40 INJECTION, POWDER, LYOPHILIZED, FOR SOLUTION INTRAMUSCULAR; INTRAVENOUS EVERY 12 HOURS SCHEDULED
Status: DISCONTINUED | OUTPATIENT
Start: 2024-05-27 | End: 2024-05-28 | Stop reason: HOSPADM

## 2024-05-27 RX ADMIN — METHYLPREDNISOLONE SODIUM SUCCINATE 40 MG: 40 INJECTION, POWDER, FOR SOLUTION INTRAMUSCULAR; INTRAVENOUS at 09:19

## 2024-05-27 RX ADMIN — OXYBUTYNIN CHLORIDE 5 MG: 5 TABLET ORAL at 21:02

## 2024-05-27 RX ADMIN — Medication 1000 UNITS: at 09:18

## 2024-05-27 RX ADMIN — ENOXAPARIN SODIUM 40 MG: 40 INJECTION SUBCUTANEOUS at 09:19

## 2024-05-27 RX ADMIN — LEVALBUTEROL HYDROCHLORIDE 1.25 MG: 1.25 SOLUTION RESPIRATORY (INHALATION) at 14:22

## 2024-05-27 RX ADMIN — TAMSULOSIN HYDROCHLORIDE 0.4 MG: 0.4 CAPSULE ORAL at 21:02

## 2024-05-27 RX ADMIN — METHYLPREDNISOLONE SODIUM SUCCINATE 40 MG: 40 INJECTION, POWDER, FOR SOLUTION INTRAMUSCULAR; INTRAVENOUS at 21:01

## 2024-05-27 RX ADMIN — CARVEDILOL 3.12 MG: 3.12 TABLET, FILM COATED ORAL at 09:18

## 2024-05-27 RX ADMIN — LEVALBUTEROL HYDROCHLORIDE 1.25 MG: 1.25 SOLUTION RESPIRATORY (INHALATION) at 20:02

## 2024-05-27 RX ADMIN — GABAPENTIN 100 MG: 100 CAPSULE ORAL at 21:02

## 2024-05-27 RX ADMIN — INSULIN LISPRO 4 UNITS: 100 INJECTION, SOLUTION INTRAVENOUS; SUBCUTANEOUS at 11:29

## 2024-05-27 RX ADMIN — INSULIN LISPRO 2 UNITS: 100 INJECTION, SOLUTION INTRAVENOUS; SUBCUTANEOUS at 16:51

## 2024-05-27 RX ADMIN — GUAIFENESIN 600 MG: 600 TABLET ORAL at 09:19

## 2024-05-27 RX ADMIN — LEVALBUTEROL HYDROCHLORIDE 1.25 MG: 1.25 SOLUTION RESPIRATORY (INHALATION) at 07:26

## 2024-05-27 RX ADMIN — SERTRALINE HYDROCHLORIDE 50 MG: 50 TABLET ORAL at 09:17

## 2024-05-27 RX ADMIN — INSULIN LISPRO 2 UNITS: 100 INJECTION, SOLUTION INTRAVENOUS; SUBCUTANEOUS at 07:52

## 2024-05-27 RX ADMIN — CARVEDILOL 3.12 MG: 3.12 TABLET, FILM COATED ORAL at 21:02

## 2024-05-27 RX ADMIN — ATORVASTATIN CALCIUM 20 MG: 20 TABLET, FILM COATED ORAL at 09:17

## 2024-05-27 RX ADMIN — CLOPIDOGREL 75 MG: 75 TABLET ORAL at 21:01

## 2024-05-27 RX ADMIN — LEVOTHYROXINE SODIUM 200 MCG: 100 TABLET ORAL at 09:59

## 2024-05-27 RX ADMIN — UMECLIDINIUM BROMIDE AND VILANTEROL TRIFENATATE 1 PUFF: 62.5; 25 POWDER RESPIRATORY (INHALATION) at 09:27

## 2024-05-27 RX ADMIN — GUAIFENESIN 600 MG: 600 TABLET ORAL at 18:07

## 2024-05-27 RX ADMIN — METHYLPREDNISOLONE SODIUM SUCCINATE 40 MG: 40 INJECTION, POWDER, FOR SOLUTION INTRAMUSCULAR; INTRAVENOUS at 01:30

## 2024-05-27 NOTE — PLAN OF CARE
HISTORY and PHYSICAL  [facility]       NAME:  Leah Gonzalez  MRN: [id number]   YOB: 1959   Date: 1/11/2024   Age: 64 y.o.  Gender: female       COMPLAINT AND PRESENT HISTORY:       Leah Gonzalez is 64 y.o.,  female, undergoing preadmission testing for :  Sacral insufficiency fracture     Patient will be scheduled to have:  KYPHOPLASTY LUMBAR SACRAL VERTEBROPLASTY L5-S1       Office note per Dr. French on 1/9/24  HPI:  This is a 64 y.o. female who presents to the clinic today for low back evaluation.   Patient was previously seen by Dr. Alvaraod.   She reports pain in her low back and right groin that has been ongoing since November.   Above reviewed with patient and she concurs    UPDATE:     Patient is s/p :   Patient has hx of fall in December 20th. 2023 She states that she fell down 3 concrete stair she reports that she developed right groin  and left rib pain. . Pt denies any LOC. She states that she did not seek the ED, in fact she states that she got up and went to the store.   Pt states that she was having previous back pain a month before.    Pt denies ever having a bone density test done before.   Patient has a long term history of low back pain.   Pt denies any prior injury to back and denies any prior back surgeries.     Pt  complains of  non radiating pain in the low back side.    The symptoms started 2 months ago.      Pt complains of pain, deformity, limitation in the range of motion. The problem has been gradually worsening since onset.    Patient rates the pain at 6-7/10.  Patient describes her pain as dull constant occasional sharp    Patient reports that  cleaning, prolong sitting and standing aggravates sxs.the most.      Patient has used analgesic patch, Ibuprophen,  pain Ultram  interventions to help with the pain.  Patient is  using assistive device (cane)  to aide in ambulation.     No recent falls or trauma.  Pt  denies any bowel or  bladder    Problem: PAIN - ADULT  Goal: Verbalizes/displays adequate comfort level or baseline comfort level  Description: Interventions:  - Encourage patient to monitor pain and request assistance  - Assess pain using appropriate pain scale  - Administer analgesics based on type and severity of pain and evaluate response  - Implement non-pharmacological measures as appropriate and evaluate response  - Consider cultural and social influences on pain and pain management  - Notify physician/advanced practitioner if interventions unsuccessful or patient reports new pain  Outcome: Progressing     Problem: DISCHARGE PLANNING  Goal: Discharge to home or other facility with appropriate resources  Description: INTERVENTIONS:  - Identify barriers to discharge w/patient and caregiver  - Arrange for needed discharge resources and transportation as appropriate  - Identify discharge learning needs (meds, wound care, etc.)  - Arrange for interpretive services to assist at discharge as needed  - Refer to Case Management Department for coordinating discharge planning if the patient needs post-hospital services based on physician/advanced practitioner order or complex needs related to functional status, cognitive ability, or social support system  Outcome: Progressing     Problem: Knowledge Deficit  Goal: Patient/family/caregiver demonstrates understanding of disease process, treatment plan, medications, and discharge instructions  Description: Complete learning assessment and assess knowledge base.  Interventions:  - Provide teaching at level of understanding  - Provide teaching via preferred learning methods  Outcome: Progressing     Problem: RESPIRATORY - ADULT  Goal: Achieves optimal ventilation and oxygenation  Description: INTERVENTIONS:  - Assess for changes in respiratory status  - Assess for changes in mentation and behavior  - Position to facilitate oxygenation and minimize respiratory effort  - Oxygen administered by appropriate  delivery if ordered  - Initiate smoking cessation education as indicated  - Encourage broncho-pulmonary hygiene including cough, deep breathe, Incentive Spirometry  - Assess the need for suctioning and aspirate as needed  - Assess and instruct to report SOB or any respiratory difficulty  - Respiratory Therapy support as indicated  Outcome: Progressing

## 2024-05-27 NOTE — PROGRESS NOTES
[facility]  Progress Note  Name: Joseph Nath I  MRN: [id number]  Unit/Bed#: -01 I Date of Admission: 5/26/2024   Date of Service: 5/27/2024 I Hospital Day: 1    Assessment & Plan   * Chronic obstructive pulmonary disease with acute exacerbation (HCC)  Assessment & Plan  Patient with history of COPD, not on home oxygen.  Had a viral bronchitis earlier in the month, given a Medrol Dosepak initially, was follow-up with a short course of steroids for continued symptoms.  Patient reports continued symptoms over the past 2 weeks of shortness of breath and increased cough with thick sputum production.  -Associated hypoxia, currently on 2 L  -IV steroids with Solu-Medrol, wean as below  -Continue home daily inhaler, ATC xopenox and saline nebs  -Respiratory protocol  - Mucinex BID  -Incentive spirometer, flutter  -Mild leukocytosis, procalcitonin negative, no associated pneumonia, stop antibiotics  -5/27: patient improved notably, weaned off O2 to room air, ambulated with desaturation. He previously did not respond to outpatient steroid pack, so will wean IV steroids, continue ATC nebs/resp protocol and if he remains clinically improved likely discharge 24 hours    History of prostate cancer  Assessment & Plan  Continue oxybutynin  Vesicare and mytrebiq not on formulary    RAJI (obstructive sleep apnea)  Assessment & Plan  Continue nightly CPAP    Peripheral arterial disease (HCC)  Assessment & Plan  Patient has reported arterial stent in left due to aneurysm per patient  Continue plavix    Primary hypertension  Assessment & Plan  Continue carvedilol    Type 2 diabetes mellitus with diabetic neuropathy, without long-term current use of insulin (HCC)  Assessment & Plan  Lab Results   Component Value Date    HGBA1C 6.5 (H) 05/26/2024       Recent Labs     05/26/24  1813 05/26/24  2104 05/27/24  0707 05/27/24  1107   POCGLU 183* 171* 175* 247*       Blood Sugar Average: Last 72 hrs:  (P)  194Update hemoglobin A1c  Hold home oral hypoglycemics  Diabetic diet  Insulin sliding scale  Continue gabapentin as prior for peripheral neuropathy                 VTE Pharmacologic Prophylaxis:   Moderate Risk (Score 3-4) - Pharmacological DVT Prophylaxis Ordered: enoxaparin (Lovenox).    Mobility:   Basic Mobility Inpatient Raw Score: 24  JH-HLM Goal: 8: Walk 250 feet or more  JH-HLM Achieved: 8: Walk 250 feet ot more  JH-HLM Goal achieved. Continue to encourage appropriate mobility.    Patient Centered Rounds: I performed bedside rounds with nursing staff today.   Discussions with Specialists or Other Care Team Provider: none    Education and Discussions with Family / Patient: Updated  (granddaughter) at bedside.    Total Time Spent on Date of Encounter in care of patient: 35 mins. This time was spent on one or more of the following: performing physical exam; counseling and coordination of care; obtaining or reviewing history; documenting in the medical record; reviewing/ordering tests, medications or procedures; communicating with other healthcare professionals and discussing with patient's family/caregivers.    Current Length of Stay: 1 day(s)  Current Patient Status: Inpatient   Certification Statement: The patient will continue to require additional inpatient hospital stay due to COPD exac  Discharge Plan: Anticipate discharge tomorrow to home.    Code Status: Level 1 - Full Code    Subjective:   Patient feels much better, still has a cough but feels he is clearing his mucus better, less chest congestion, he feels improved    Objective:     Vitals:   Temp (24hrs), Av.2 °F (36.2 °C), Min:97 °F (36.1 °C), Max:97.5 °F (36.4 °C)    Temp:  [97 °F (36.1 °C)-97.5 °F (36.4 °C)] 97 °F (36.1 °C)  HR:  [73-86] 83  Resp:  [18-22] 22  BP: (140-145)/(73-89) 145/76  SpO2:  [92 %-96 %] 96 %  Body mass index is 33.77 kg/m².     Input and Output Summary (last 24 hours):     Intake/Output Summary (Last 24  hours) at 5/27/2024 1345  Last data filed at 5/27/2024 1239  Gross per 24 hour   Intake 870 ml   Output --   Net 870 ml       Physical Exam:   Physical Exam  Vitals and nursing note reviewed.   Constitutional:       General: He is not in acute distress.     Appearance: He is well-developed.   HENT:      Head: Normocephalic and atraumatic.   Eyes:      Conjunctiva/sclera: Conjunctivae normal.   Cardiovascular:      Rate and Rhythm: Normal rate and regular rhythm.      Heart sounds: No murmur heard.  Pulmonary:      Effort: Pulmonary effort is normal. No respiratory distress.      Comments: Reduced breath sounds bilaterally, do not appreciate any wheezing  Abdominal:      Palpations: Abdomen is soft.      Tenderness: There is no abdominal tenderness.   Musculoskeletal:         General: No swelling.      Cervical back: Neck supple.   Skin:     General: Skin is warm and dry.      Capillary Refill: Capillary refill takes less than 2 seconds.   Neurological:      Mental Status: He is alert.   Psychiatric:         Mood and Affect: Mood normal.          Additional Data:     Labs:  Results from last 7 days   Lab Units 05/27/24  0453 05/26/24  1340   WBC Thousand/uL 8.93 10.71*   HEMOGLOBIN g/dL 10.7* 12.0   HEMATOCRIT % 32.0* 35.5*   PLATELETS Thousands/uL 210 245   SEGS PCT %  --  60   LYMPHO PCT %  --  22   MONO PCT %  --  10   EOS PCT %  --  7*     Results from last 7 days   Lab Units 05/27/24  0453 05/26/24  1340   SODIUM mmol/L 131* 133*   POTASSIUM mmol/L 4.4 4.3   CHLORIDE mmol/L 101 100   CO2 mmol/L 24 24   BUN mg/dL 20 15   CREATININE mg/dL 1.05 1.10   ANION GAP mmol/L 6 9   CALCIUM mg/dL 8.5 9.4   ALBUMIN g/dL  --  4.2   TOTAL BILIRUBIN mg/dL  --  0.66   ALK PHOS U/L  --  76   ALT U/L  --  14   AST U/L  --  23   GLUCOSE RANDOM mg/dL 166* 134         Results from last 7 days   Lab Units 05/27/24  1107 05/27/24  0707 05/26/24  2104 05/26/24  1813   POC GLUCOSE mg/dl 247* 175* 171* 183*     Results from last 7 days    Lab Units 05/26/24  1340   HEMOGLOBIN A1C % 6.5*     Results from last 7 days   Lab Units 05/26/24  1358 05/26/24  1340   LACTIC ACID mmol/L 1.0  --    PROCALCITONIN ng/ml  --  0.10       Lines/Drains:  Invasive Devices       Peripheral Intravenous Line  Duration             Peripheral IV 05/26/24 Right Antecubital 1 day                          Imaging: No pertinent imaging reviewed.    Recent Cultures (last 7 days):   Results from last 7 days   Lab Units 05/26/24  1432 05/26/24  1358   BLOOD CULTURE  Received in Microbiology Lab. Culture in Progress. Received in Microbiology Lab. Culture in Progress.       Last 24 Hours Medication List:   Current Facility-Administered Medications   Medication Dose Route Frequency Provider Last Rate    acetaminophen  650 mg Oral Q6H PRN Galen Cezar Counts, DO      atorvastatin  20 mg Oral Daily Galen Cezar Counts, DO      carvedilol  3.125 mg Oral BID With Meals Galen Cezar Counts, DO      Cholecalciferol  1,000 Units Oral Daily Galen Cezar Counts, DO      clopidogrel  75 mg Oral Daily Galen Cezar Counts, DO      enoxaparin  40 mg Subcutaneous Daily Galen Cezar Counts, DO      gabapentin  100 mg Oral HS Galen Cezar Counts, DO      guaiFENesin  600 mg Oral BID Galen Cezar Counts, DO      insulin lispro  2-12 Units Subcutaneous TID AC Galen Cezar Counts, DO      levalbuterol  1.25 mg Nebulization TID Galen Cezar Counts, DO      levothyroxine  200 mcg Oral Daily Galen Cezar Counts, DO      methylPREDNISolone sodium succinate  40 mg Intravenous Q12H SEBASTIAN Galen Cezar Counts, DO      oxybutynin  5 mg Oral Daily Galen Cezar Counts, DO      sertraline  50 mg Oral Daily Galen Cezar Counts, DO      tamsulosin  0.4 mg Oral Daily With Dinner Galen Cezar Counts, DO      umeclidinium-vilanterol  1 puff Inhalation Daily Galen Cezar Counts, DO          Today, Patient Was Seen By: Galen Cartwright, DO    **Please Note: This note may have been constructed using a voice  recognition system.**

## 2024-05-27 NOTE — ASSESSMENT & PLAN NOTE
Lab Results   Component Value Date    HGBA1C 6.5 (H) 05/26/2024       Recent Labs     05/26/24  1813 05/26/24  2104 05/27/24  0707 05/27/24  1107   POCGLU 183* 171* 175* 247*       Blood Sugar Average: Last 72 hrs:  (P) 194Update hemoglobin A1c  Hold home oral hypoglycemics  Diabetic diet  Insulin sliding scale  Continue gabapentin as prior for peripheral neuropathy

## 2024-05-27 NOTE — RESPIRATORY THERAPY NOTE
RT Protocol Note  Joseph Nath 84 y.o. male MRN: [id number]  Unit/Bed#: -01 Encounter: [id number]    Assessment    Principal Problem:    Chronic obstructive pulmonary disease with acute exacerbation (HCC)  Active Problems:    Type 2 diabetes mellitus with diabetic neuropathy, without long-term current use of insulin (HCC)    Primary hypertension    Peripheral arterial disease (HCC)    RAJI (obstructive sleep apnea)    History of prostate cancer      Home Pulmonary Medications:         Past Medical History:   Diagnosis Date    AAA (abdominal aortic aneurysm) (HCC)     Arthritis     Chronic kidney disease     Chronic pain disorder     low back    Colon polyp     COPD (chronic obstructive pulmonary disease) (HCC)     CPAP (continuous positive airway pressure) dependence     Diabetes (HCC)     Diabetes mellitus (HCC)     Disease of thyroid gland     Dyslipidemia     MARY (generalized anxiety disorder)     GERD (gastroesophageal reflux disease)     Hyperlipidemia     Hypertension     Obesity     Osteoporosis     Shortness of breath     Sleep apnea     TIA (transient ischemic attack)     Wears dentures      Social History     Socioeconomic History    Marital status: /Civil Union     Spouse name: None    Number of children: None    Years of education: None    Highest education level: None   Occupational History    None   Tobacco Use    Smoking status: Former     Current packs/day: 0.00     Types: Cigarettes     Quit date: 9/15/2007     Years since quittin.7    Smokeless tobacco: Never   Vaping Use    Vaping status: Never Used   Substance and Sexual Activity    Alcohol use: Not Currently     Comment: rarely    Drug use: Never    Sexual activity: None   Other Topics Concern    None   Social History Narrative    None     Social Determinants of Health     Financial Resource Strain: Not on file   Food Insecurity: Patient Declined (2023)    Received from [facility]    Hunger Vital Sign     Worried About  Running Out of Food in the Last Year: Patient declined     Ran Out of Food in the Last Year: Patient declined   Transportation Needs: Not on file   Physical Activity: Not on file   Stress: Not on file   Social Connections: Not on file   Intimate Partner Violence: Not on file   Housing Stability: Not on file       Subjective         Objective    Physical Exam:   Assessment Type: Assess only  General Appearance: Awake  Respiratory Pattern: Normal  Chest Assessment: Chest expansion symmetrical  Bilateral Breath Sounds: Diminished  Cough: Productive, Congested    Vitals:  Blood pressure 140/73, pulse 86, temperature 97.5 °F (36.4 °C), resp. rate 18, height 6' (1.829 m), weight 113 kg (249 lb), SpO2 94%.          Imaging and other studies: I have personally reviewed pertinent reports.            Plan    Respiratory Plan: Mild Distress pathway  Airway Clearance Plan: Incentive Spirometer     Resp Comments: Pt has own cpap and mask for hs

## 2024-05-27 NOTE — ASSESSMENT & PLAN NOTE
Patient with history of COPD, not on home oxygen.  Had a viral bronchitis earlier in the month, given a Medrol Dosepak initially, was follow-up with a short course of steroids for continued symptoms.  Patient reports continued symptoms over the past 2 weeks of shortness of breath and increased cough with thick sputum production.  -Associated hypoxia, currently on 2 L  -IV steroids with Solu-Medrol, wean as below  -Continue home daily inhaler, ATC xopenox and saline nebs  -Respiratory protocol  - Mucinex BID  -Incentive spirometer, flutter  -Mild leukocytosis, procalcitonin negative, no associated pneumonia, stop antibiotics  -5/27: patient improved notably, weaned off O2 to room air, ambulated with desaturation. He previously did not respond to outpatient steroid pack, so will wean IV steroids, continue ATC nebs/resp protocol and if he remains clinically improved likely discharge 24 hours

## 2024-05-28 VITALS
TEMPERATURE: 97.3 F | HEIGHT: 72 IN | BODY MASS INDEX: 33.72 KG/M2 | OXYGEN SATURATION: 94 % | RESPIRATION RATE: 18 BRPM | SYSTOLIC BLOOD PRESSURE: 133 MMHG | HEART RATE: 65 BPM | DIASTOLIC BLOOD PRESSURE: 63 MMHG | WEIGHT: 249 LBS

## 2024-05-28 LAB
ANION GAP SERPL CALCULATED.3IONS-SCNC: 7 MMOL/L (ref 4–13)
ATRIAL RATE: 81 BPM
BUN SERPL-MCNC: 30 MG/DL (ref 5–25)
CALCIUM SERPL-MCNC: 9 MG/DL (ref 8.4–10.2)
CHLORIDE SERPL-SCNC: 100 MMOL/L (ref 96–108)
CO2 SERPL-SCNC: 24 MMOL/L (ref 21–32)
CREAT SERPL-MCNC: 1.19 MG/DL (ref 0.6–1.3)
GFR SERPL CREATININE-BSD FRML MDRD: 56 ML/MIN/1.73SQ M
GLUCOSE SERPL-MCNC: 167 MG/DL (ref 65–140)
GLUCOSE SERPL-MCNC: 171 MG/DL (ref 65–140)
P AXIS: 73 DEGREES
POTASSIUM SERPL-SCNC: 4.5 MMOL/L (ref 3.5–5.3)
PR INTERVAL: 224 MS
QRS AXIS: 73 DEGREES
QRSD INTERVAL: 86 MS
QT INTERVAL: 384 MS
QTC INTERVAL: 446 MS
SODIUM SERPL-SCNC: 131 MMOL/L (ref 135–147)
T WAVE AXIS: -6 DEGREES
VENTRICULAR RATE: 81 BPM

## 2024-05-28 PROCEDURE — 80048 BASIC METABOLIC PNL TOTAL CA: CPT | Performed by: HOSPITALIST

## 2024-05-28 PROCEDURE — 99239 HOSP IP/OBS DSCHRG MGMT >30: CPT | Performed by: FAMILY MEDICINE

## 2024-05-28 PROCEDURE — 82948 REAGENT STRIP/BLOOD GLUCOSE: CPT

## 2024-05-28 PROCEDURE — 94640 AIRWAY INHALATION TREATMENT: CPT

## 2024-05-28 PROCEDURE — 94760 N-INVAS EAR/PLS OXIMETRY 1: CPT

## 2024-05-28 RX ORDER — GABAPENTIN 100 MG/1
100 CAPSULE ORAL
Start: 2024-05-28

## 2024-05-28 RX ORDER — ANTIOX #8/OM3/DHA/EPA/LUT/ZEAX 250-2.5 MG
1 CAPSULE ORAL 2 TIMES DAILY
Status: DISCONTINUED | OUTPATIENT
Start: 2024-05-28 | End: 2024-05-28 | Stop reason: HOSPADM

## 2024-05-28 RX ADMIN — SERTRALINE HYDROCHLORIDE 50 MG: 50 TABLET ORAL at 08:01

## 2024-05-28 RX ADMIN — LEVALBUTEROL HYDROCHLORIDE 1.25 MG: 1.25 SOLUTION RESPIRATORY (INHALATION) at 07:37

## 2024-05-28 RX ADMIN — METHYLPREDNISOLONE SODIUM SUCCINATE 40 MG: 40 INJECTION, POWDER, FOR SOLUTION INTRAMUSCULAR; INTRAVENOUS at 08:01

## 2024-05-28 RX ADMIN — LEVOTHYROXINE SODIUM 200 MCG: 100 TABLET ORAL at 05:25

## 2024-05-28 RX ADMIN — GUAIFENESIN 600 MG: 600 TABLET ORAL at 08:01

## 2024-05-28 RX ADMIN — ATORVASTATIN CALCIUM 20 MG: 20 TABLET, FILM COATED ORAL at 08:01

## 2024-05-28 RX ADMIN — CARVEDILOL 3.12 MG: 3.12 TABLET, FILM COATED ORAL at 08:01

## 2024-05-28 RX ADMIN — ENOXAPARIN SODIUM 40 MG: 40 INJECTION SUBCUTANEOUS at 08:02

## 2024-05-28 RX ADMIN — Medication 1000 UNITS: at 08:01

## 2024-05-28 RX ADMIN — INSULIN LISPRO 2 UNITS: 100 INJECTION, SOLUTION INTRAVENOUS; SUBCUTANEOUS at 08:00

## 2024-05-28 RX ADMIN — UMECLIDINIUM BROMIDE AND VILANTEROL TRIFENATATE 1 PUFF: 62.5; 25 POWDER RESPIRATORY (INHALATION) at 08:00

## 2024-05-28 NOTE — ASSESSMENT & PLAN NOTE
Patient with history of COPD, not on home oxygen.  Had a viral bronchitis earlier in the month, given a Medrol Dosepak initially, was follow-up with a short course of steroids for continued symptoms.  Patient reports continued symptoms over the past 2 weeks of shortness of breath and increased cough with thick sputum production.  -Associated hypoxia, currently on 2 L  -IV steroids with Solu-Medrol, wean as below  -Continue home daily inhaler, ATC xopenox and saline nebs  -Respiratory protocol  - Mucinex BID  -Incentive spirometer, flutter  -Mild leukocytosis, procalcitonin negative, no associated pneumonia, stop antibiotics  -5/27: patient improved notably, weaned off O2 to room air, ambulated with desaturation. He previously did not respond to outpatient steroid pack, so will wean IV steroids, continue ATC nebs/resp protocol and if he remains clinically improved likely discharge 24 hours  5/28-lungs clear without any wheezing or rails he is on room air without shortness of breath discharge home previous provider but he prescribed him prednisone and doxycycline

## 2024-05-28 NOTE — DISCHARGE SUMMARY
[facility]  Discharge- Joseph Nath 1940, 84 y.o. male MRN: [id number]  Unit/Bed#: [facility] Encounter: [id number]  Primary Care Provider: Ruma Joshi MD   Date and time admitted to hospital: 5/26/2024  1:14 PM    * Chronic obstructive pulmonary disease with acute exacerbation (HCC)  Assessment & Plan  Patient with history of COPD, not on home oxygen.  Had a viral bronchitis earlier in the month, given a Medrol Dosepak initially, was follow-up with a short course of steroids for continued symptoms.  Patient reports continued symptoms over the past 2 weeks of shortness of breath and increased cough with thick sputum production.  -Associated hypoxia, currently on 2 L  -IV steroids with Solu-Medrol, wean as below  -Continue home daily inhaler, ATC xopenox and saline nebs  -Respiratory protocol  - Mucinex BID  -Incentive spirometer, flutter  -Mild leukocytosis, procalcitonin negative, no associated pneumonia, stop antibiotics  -5/27: patient improved notably, weaned off O2 to room air, ambulated with desaturation. He previously did not respond to outpatient steroid pack, so will wean IV steroids, continue ATC nebs/resp protocol and if he remains clinically improved likely discharge 24 hours  5/28-lungs clear without any wheezing or rails he is on room air without shortness of breath discharge home previous provider but he prescribed him prednisone and doxycycline    History of prostate cancer  Assessment & Plan  Continue oxybutynin  Vesicare and mytrebiq not on formulary    RAJI (obstructive sleep apnea)  Assessment & Plan  Continue nightly CPAP    Peripheral arterial disease (HCC)  Assessment & Plan  Patient has reported arterial stent in left due to aneurysm per patient  Continue plavix    Primary hypertension  Assessment & Plan  Continue carvedilol    Type 2 diabetes mellitus with diabetic neuropathy, without long-term current use of insulin (HCC)  Assessment & Plan  Lab Results    Component Value Date    HGBA1C 6.5 (H) 05/26/2024       Recent Labs     05/27/24  0707 05/27/24  1107 05/27/24  1613 05/28/24  0718   POCGLU 175* 247* 160* 167*         Blood Sugar Average: Last 72 hrs:  (P) 183.5576838341658892Dbveal hemoglobin A1c  Continue home meds  Diabetic diet  Insulin sliding scale  Continue gabapentin as prior for peripheral neuropathy            Medical Problems       Resolved Problems  Date Reviewed: 5/27/2024   None       Discharging Physician / Practitioner: Noreen Spencer MD  PCP: Ruma Joshi MD  Admission Date:   Admission Orders (From admission, onward)       Ordered        05/26/24 1629  INPATIENT ADMISSION  Once                          Discharge Date: 05/28/24    Consultations During Hospital Stay:  none    Procedures Performed:   none    Significant Findings / Test Results:   Cxr-Large lungs suggestive of COPD. Prominent pulmonary vascularity which may be accentuated by portable technique. Consider PA and lateral radiograph when the patient is able.     Incidental Findings:   none    Test Results Pending at Discharge (will require follow up):   none     Outpatient Tests Requested:  none    Complications:  none    Reason for Admission: Shortness of breath    Hospital Course:   Joseph Nath is a 84 y.o. male patient who originally presented to the hospital on 5/26/2024 due to shortness of breath found to have COPD exacerbation and chronic bronchitis with acute exacerbation off no evidence of pneumonia started on Solu-Medrol with significant improvement without any wheezing shortness of breath improvement and being on room air rest and ambulatory will be discharged on prednisone which has been already sent to the pharmacy by previous provider and doxycycline.        Please see above list of diagnoses and related plan for additional information.     Condition at Discharge: stable    Discharge Day Visit / Exam:   Subjective: Patient seen and examined denies any shortness of  breath feeling good  Vitals: Blood Pressure: 133/63 (05/28/24 0723)  Pulse: 65 (05/28/24 0723)  Temperature: (!) 97.3 °F (36.3 °C) (05/28/24 0723)  Temp Source: Temporal (05/27/24 0709)  Respirations: 18 (05/28/24 0723)  Height: 6' (182.9 cm) (05/26/24 1711)  Weight - Scale: 113 kg (249 lb) (05/26/24 1711)  SpO2: 94 % (05/28/24 0737)  Exam:   Physical Exam  Vitals and nursing note reviewed.   Constitutional:       General: He is not in acute distress.     Appearance: He is well-developed.   HENT:      Head: Normocephalic and atraumatic.   Eyes:      Conjunctiva/sclera: Conjunctivae normal.   Cardiovascular:      Rate and Rhythm: Normal rate and regular rhythm.      Heart sounds: No murmur heard.  Pulmonary:      Effort: Pulmonary effort is normal. No respiratory distress.      Breath sounds: Normal breath sounds. No wheezing or rales.   Abdominal:      General: Bowel sounds are normal. There is no distension.      Palpations: Abdomen is soft.      Tenderness: There is no abdominal tenderness.   Musculoskeletal:         General: No swelling.      Cervical back: Neck supple.   Skin:     General: Skin is warm and dry.      Capillary Refill: Capillary refill takes less than 2 seconds.   Neurological:      Mental Status: He is alert and oriented to person, place, and time.   Psychiatric:         Mood and Affect: Mood normal.          Discussion with Family: pt     Discharge instructions/Information to patient and family:   See after visit summary for information provided to patient and family.      Provisions for Follow-Up Care:  See after visit summary for information related to follow-up care and any pertinent home health orders.      Mobility at time of Discharge:   Basic Mobility Inpatient Raw Score: 24  JH-HLM Goal: 8: Walk 250 feet or more  JH-HLM Achieved: 8: Walk 250 feet ot more  HLM Goal achieved. Continue to encourage appropriate mobility.     Disposition:   Home    Planned Readmission: no     Discharge  Statement:  I spent >35 minutes discharging the patient. This time was spent on the day of discharge. I had direct contact with the patient on the day of discharge. Greater than 50% of the total time was spent examining patient, answering all patient questions, arranging and discussing plan of care with patient as well as directly providing post-discharge instructions.  Additional time then spent on discharge activities.    Discharge Medications:  See after visit summary for reconciled discharge medications provided to patient and/or family.      **Please Note: This note may have been constructed using a voice recognition system**

## 2024-05-28 NOTE — ASSESSMENT & PLAN NOTE
Lab Results   Component Value Date    HGBA1C 6.5 (H) 05/26/2024       Recent Labs     05/27/24  0707 05/27/24  1107 05/27/24  1613 05/28/24  0718   POCGLU 175* 247* 160* 167*         Blood Sugar Average: Last 72 hrs:  (P) [id number] hemoglobin A1c  Continue home meds  Diabetic diet  Insulin sliding scale  Continue gabapentin as prior for peripheral neuropathy

## 2024-05-31 LAB
BACTERIA BLD CULT: NORMAL
BACTERIA BLD CULT: NORMAL

## 2024-08-06 RX ORDER — FEXOFENADINE HCL 180 MG/1
TABLET ORAL
COMMUNITY

## 2024-08-13 RX ORDER — OXYCODONE HYDROCHLORIDE 5 MG/1
TABLET ORAL
COMMUNITY
End: 2024-08-14

## 2024-08-13 RX ORDER — TADALAFIL 5 MG/1
TABLET ORAL
COMMUNITY
End: 2024-08-14

## 2024-08-14 ENCOUNTER — CONSULT (OUTPATIENT)
Dept: PULMONOLOGY | Facility: CLINIC | Age: 84
End: 2024-08-14
Payer: MEDICARE

## 2024-08-14 VITALS
RESPIRATION RATE: 20 BRPM | SYSTOLIC BLOOD PRESSURE: 128 MMHG | BODY MASS INDEX: 32.6 KG/M2 | TEMPERATURE: 96.9 F | WEIGHT: 246 LBS | HEART RATE: 64 BPM | HEIGHT: 73 IN | OXYGEN SATURATION: 94 % | DIASTOLIC BLOOD PRESSURE: 76 MMHG

## 2024-08-14 DIAGNOSIS — E66.01 SEVERE OBESITY WITH BODY MASS INDEX (BMI) OF 35.0 TO 39.9 WITH SERIOUS COMORBIDITY (HCC): ICD-10-CM

## 2024-08-14 DIAGNOSIS — G47.33 OSA (OBSTRUCTIVE SLEEP APNEA): ICD-10-CM

## 2024-08-14 DIAGNOSIS — J44.9 CHRONIC OBSTRUCTIVE PULMONARY DISEASE, UNSPECIFIED (HCC): Primary | ICD-10-CM

## 2024-08-14 PROCEDURE — 99203 OFFICE O/P NEW LOW 30 MIN: CPT | Performed by: INTERNAL MEDICINE

## 2024-08-14 RX ORDER — FLUTICASONE FUROATE, UMECLIDINIUM BROMIDE AND VILANTEROL TRIFENATATE 100; 62.5; 25 UG/1; UG/1; UG/1
1 POWDER RESPIRATORY (INHALATION) DAILY
Qty: 180 BLISTER | Refills: 0 | Status: SHIPPED | OUTPATIENT
Start: 2024-08-14 | End: 2024-08-15 | Stop reason: SDUPTHER

## 2024-08-14 RX ORDER — FLUTICASONE FUROATE, UMECLIDINIUM BROMIDE AND VILANTEROL TRIFENATATE 100; 62.5; 25 UG/1; UG/1; UG/1
1 POWDER RESPIRATORY (INHALATION) DAILY
Qty: 180 BLISTER | Refills: 0 | Status: SHIPPED | OUTPATIENT
Start: 2024-08-14 | End: 2024-08-14

## 2024-08-14 RX ORDER — IPRATROPIUM BROMIDE AND ALBUTEROL SULFATE 2.5; .5 MG/3ML; MG/3ML
3 SOLUTION RESPIRATORY (INHALATION) EVERY 6 HOURS PRN
Qty: 270 ML | Refills: 5 | Status: SHIPPED | OUTPATIENT
Start: 2024-08-14 | End: 2024-08-15 | Stop reason: SDUPTHER

## 2024-08-14 NOTE — PROGRESS NOTES
Pulmonary Consultation   Joseph Nath 84 y.o. male MRN: [id number]  8/18/2024      Assessment:  COPD  By clinical history/chest x-ray with hyperinflation  Heavy tobacco abuse history 50-pack-year quit in 2003  On exacerbation/hospitalized in 5/2024  At baseline, chronic bronchitis minimally productive cough and dyspnea on exertion  Stable on Anoro Ellipta for several years  No frequent use of PRN albuterol    Plan:  Given the ongoing symptoms, step up treatment to Trelegy Ellipta 100  Educated about the proper inhaler use technique, rinse mouth following each use  Nebulizer/supplies with DuoNeb every 6 as needed, states sometimes has difficulty doing the maneuver to the inhaler  Hold off PFT for now, if notes symptoms controlled with triple inhalers will consider PFT to assess lung function, possible need for intervention  Up-to-date on immunization      Obstructive sleep apnea  Diagnosed more than 5 years ago via sleep study at the local sleep center  Using the CPAP consistently however still with daytime fatigue, excessive sleepiness during the day  Ordered in lab titration study      Former tobacco abuse  About 50-pack-year quit in 2007  Out of the window for lung cancer screening    Return in about 3 months (around 11/14/2024).        History of Present Illness     New Consult for: COPD      Background  84 y.o. male with a h/o AAA, prostate cancer, osteoarthritis, CKD 3, chronic pain syndrome, DM2, hypothyroidism, generalized anxiety disorder, acid reflux, HTN, class I obesity, osteoporosis, TIA, COPD, RAJI, former tobacco abuse    Hospitalized 5/2024 for COPD with exacerbation.  Presented with viral URI-like symptoms, with acute hypoxic respiratory failure.  Required up to 2 LPM supplemental oxygen.  Treated with IV steroids, weaning nebulized inhalers.  Improved hypoxemia, on room air before discharge did not require oxygen on ambulatory pulse oximetry.    Reports a diagnosis of COPD several years ago via  PFT in 2011, has been maintained on Anoro Ellipta since then.  Only 1 hospitalization/steroid use in 5/2024.  No other exacerbations.  Quit smoking 2007.    Reports ongoing daily cough, mainly dry, sometimes with minimal sputum production.  Does not use PRN albuterol at all.  Still on the Anoro Ellipta.  Able to walk on a surface level however at a slow pace, climb 1 flight of stairs, feels dyspnea at the end of exercise.  Also was with activity such as mowing the grass, or yard work.     Feeling better since discharge, now at baseline.    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        Studies:  Imaging and other studies: I have personally reviewed pertinent films in PACS      Pulmonary function testing:       EKG, Pathology, and Other Studies: I have personally reviewed pertinent reports.          Historical Information   Past Medical History:   Diagnosis Date    AAA (abdominal aortic aneurysm) (HCC)     Arthritis     Chronic kidney disease     Chronic pain disorder     low back    Colon polyp     COPD (chronic obstructive pulmonary disease) (HCC)     CPAP (continuous positive airway pressure) dependence     Diabetes (HCC)     Diabetes mellitus (HCC)     Disease of thyroid gland     Dyslipidemia     MARY (generalized anxiety disorder)     GERD (gastroesophageal reflux disease)     Hyperlipidemia     Hypertension     Obesity     Osteoporosis     Shortness of breath     Sleep apnea     TIA (transient ischemic attack)     Wears dentures      Past Surgical History:   Procedure Laterality Date    BACK SURGERY  07/2009    COLONOSCOPY      EPIDURAL BLOCK INJECTION N/A 9/15/2020     "Procedure: L5 S1 Lumbar Epidural Steroid Injection (42041);  Surgeon: Truong Triana MD;  Location: [facility];  Service: Pain Management     FL GUIDED NEEDLE PLAC BX/ASP/INJ  9/15/2020    FRACTURE SURGERY      stress fracture of pelvis    LEG SURGERY       Family History   Problem Relation Age of Onset    Diabetes Mother     Osteoporosis Mother     Heart disease Father          Medications/Allergies: Reviewed    Vitals: Blood pressure 128/76, pulse 64, temperature (!) 96.9 °F (36.1 °C), resp. rate 20, height 6' 1\" (1.854 m), weight 112 kg (246 lb), SpO2 94%. Body mass index is 32.46 kg/m². Oxygen Therapy  SpO2: 94 %      Physical Exam  Body mass index is 32.46 kg/m².   Gen: not in acute distress, obese  Neck/Eyes: supple, no JVD appreciated, PERRL  Ear: normal appearance, mild but not significant hearing impairment  Nose:  normal nasal mucosa, no drainage  Chest: normal respiratory efforts, managed but clear breath sounds bilaterally  CV: RRR, no murmurs appreciated, 1+ pitting edema  Abdomen: soft, non tender  Extremities:  No observed deformity, no digital clubbing  Skin: unremarkable  Neuro: AAO X3, no focal motor deficit         Labs:  Lab Results   Component Value Date    WBC 8.93 05/27/2024    HGB 10.7 (L) 05/27/2024    HCT 32.0 (L) 05/27/2024    MCV 90 05/27/2024     05/27/2024     Lab Results   Component Value Date    CALCIUM 9.0 05/28/2024    K 4.5 05/28/2024    CO2 24 05/28/2024     05/28/2024    BUN 30 (H) 05/28/2024    CREATININE 1.19 05/28/2024     No results found for: \"IGE\"  Lab Results   Component Value Date    ALT 14 05/26/2024    AST 23 05/26/2024    ALKPHOS 76 05/26/2024           Portions of the record may have been created with voice recognition software.  Occasional wrong word or \"sound a like\" substitutions may have occurred due to the inherent limitations of voice recognition software.  Read the chart carefully and recognize, using context, where substitutions have " occurred    Bakari Sparks M.D.  [facility]

## 2024-08-15 DIAGNOSIS — J44.9 CHRONIC OBSTRUCTIVE PULMONARY DISEASE, UNSPECIFIED (HCC): ICD-10-CM

## 2024-08-15 DIAGNOSIS — G47.33 OSA (OBSTRUCTIVE SLEEP APNEA): ICD-10-CM

## 2024-08-15 RX ORDER — IPRATROPIUM BROMIDE AND ALBUTEROL SULFATE 2.5; .5 MG/3ML; MG/3ML
3 SOLUTION RESPIRATORY (INHALATION) EVERY 6 HOURS PRN
Qty: 1080 ML | Refills: 1 | Status: SHIPPED | OUTPATIENT
Start: 2024-08-15 | End: 2024-11-13

## 2024-08-15 NOTE — TELEPHONE ENCOUNTER
Patient is calling asking if we could please have these script sent into [facility] as a 90 day supply, as it is a cheaper cost. Please advise

## 2024-08-16 RX ORDER — FLUTICASONE FUROATE, UMECLIDINIUM BROMIDE AND VILANTEROL TRIFENATATE 100; 62.5; 25 UG/1; UG/1; UG/1
1 POWDER RESPIRATORY (INHALATION) DAILY
Qty: 180 BLISTER | Refills: 0 | Status: SHIPPED | OUTPATIENT
Start: 2024-08-16 | End: 2024-11-14

## 2024-08-16 NOTE — TELEPHONE ENCOUNTER
Pt calling in stating the trelegyy was sent to the [facility] pharmacy but [facility]. He is asking if this could be corrected.

## 2024-08-22 ENCOUNTER — HOSPITAL ENCOUNTER (OUTPATIENT)
Dept: NON INVASIVE DIAGNOSTICS | Facility: HOSPITAL | Age: 84
Discharge: HOME | End: 2024-08-22
Payer: MEDICARE

## 2024-08-22 ENCOUNTER — HOSPITAL ENCOUNTER (OUTPATIENT)
Dept: NUCLEAR MEDICINE | Facility: HOSPITAL | Age: 84
End: 2024-08-22
Payer: MEDICARE

## 2024-08-22 VITALS
BODY MASS INDEX: 32.6 KG/M2 | WEIGHT: 246 LBS | DIASTOLIC BLOOD PRESSURE: 76 MMHG | HEART RATE: 80 BPM | HEIGHT: 73 IN | SYSTOLIC BLOOD PRESSURE: 128 MMHG

## 2024-08-22 DIAGNOSIS — I20.89 STABLE ANGINA: ICD-10-CM

## 2024-08-22 LAB
AORTIC ROOT: 3.2 CM
APICAL FOUR CHAMBER EJECTION FRACTION: 61 %
ASCENDING AORTA: 3.3 CM
E WAVE DECELERATION TIME: 120 MS
E/A RATIO: 0.88
FRACTIONAL SHORTENING: 30 (ref 28–44)
INTERVENTRICULAR SEPTUM IN DIASTOLE (PARASTERNAL SHORT AXIS VIEW): 1.4 CM
INTERVENTRICULAR SEPTUM: 1.4 CM (ref 0.6–1.1)
LAAS-AP2: 12.5 CM2
LAAS-AP4: 12.9 CM2
LEFT ATRIUM SIZE: 4.1 CM
LEFT ATRIUM VOLUME (MOD BIPLANE): 33 ML
LEFT ATRIUM VOLUME INDEX (MOD BIPLANE): 14 ML/M2
LEFT INTERNAL DIMENSION IN SYSTOLE: 3.7 CM (ref 2.1–4)
LEFT VENTRICULAR INTERNAL DIMENSION IN DIASTOLE: 5.3 CM (ref 3.5–6)
LEFT VENTRICULAR POSTERIOR WALL IN END DIASTOLE: 1.2 CM
LEFT VENTRICULAR STROKE VOLUME: 76 ML
LVSV (TEICH): 76 ML
MAX HR: 78 BPM
MV E'TISSUE VEL-SEP: 13 CM/S
MV PEAK A VEL: 0.99 M/S
MV PEAK E VEL: 87 CM/S
MV STENOSIS PRESSURE HALF TIME: 35 MS
MV VALVE AREA P 1/2 METHOD: 6.29
NUC STRESS EJECTION FRACTION: 61 %
RATE PRESSURE PRODUCT: NORMAL
RIGHT ATRIUM AREA SYSTOLE A4C: 12.7 CM2
RIGHT VENTRICLE ID DIMENSION: 2.9 CM
SL CV LEFT ATRIUM LENGTH A2C: 4.1 CM
SL CV LV EF: 60
SL CV PED ECHO LEFT VENTRICLE DIASTOLIC VOLUME (MOD BIPLANE) 2D: 134 ML
SL CV PED ECHO LEFT VENTRICLE SYSTOLIC VOLUME (MOD BIPLANE) 2D: 59 ML
SL CV REST NUCLEAR ISOTOPE DOSE: 11 MCI
SL CV STRESS NUCLEAR ISOTOPE DOSE: 32.8 MCI
SL CV STRESS RECOVERY BP: NORMAL MMHG
SL CV STRESS RECOVERY HR: 62 BPM
SL CV STRESS RECOVERY O2 SAT: 98 %
STRESS ANGINA INDEX: 0
STRESS BASELINE BP: NORMAL MMHG
STRESS BASELINE HR: 58 BPM
STRESS O2 SAT REST: 98 %
STRESS PEAK HR: 78 BPM
STRESS POST EXERCISE DUR MIN: 3 MIN
STRESS POST EXERCISE DUR SEC: 0 SEC
STRESS POST O2 SAT PEAK: 78 %
STRESS POST PEAK BP: 137 MMHG
TR MAX PG: 30 MMHG
TR PEAK VELOCITY: 2.8 M/S
TRICUSPID VALVE PEAK REGURGITATION VELOCITY: 2.76 M/S

## 2024-08-22 PROCEDURE — A9502 TC99M TETROFOSMIN: HCPCS

## 2024-08-22 PROCEDURE — 78452 HT MUSCLE IMAGE SPECT MULT: CPT

## 2024-08-22 PROCEDURE — 93017 CV STRESS TEST TRACING ONLY: CPT

## 2024-08-22 PROCEDURE — C8929 TTE W OR WO FOL WCON,DOPPLER: HCPCS

## 2024-08-22 RX ORDER — SOLIFENACIN SUCCINATE 5 MG/1
5 TABLET, FILM COATED ORAL DAILY
COMMUNITY
Start: 2024-08-08

## 2024-08-22 RX ORDER — SITAGLIPTIN 100 MG/1
TABLET, FILM COATED ORAL
COMMUNITY
Start: 2024-08-12

## 2024-08-22 RX ORDER — ALBUTEROL SULFATE 90 UG/1
2 AEROSOL, METERED RESPIRATORY (INHALATION) EVERY 6 HOURS PRN
COMMUNITY

## 2024-08-22 RX ORDER — REGADENOSON 0.08 MG/ML
0.4 INJECTION, SOLUTION INTRAVENOUS ONCE
Status: COMPLETED | OUTPATIENT
Start: 2024-08-22 | End: 2024-08-22

## 2024-08-22 RX ORDER — GLIMEPIRIDE 1 MG/1
1 TABLET ORAL
COMMUNITY

## 2024-08-22 RX ORDER — LEVOTHYROXINE SODIUM 200 UG/1
200 TABLET ORAL DAILY
COMMUNITY

## 2024-08-22 RX ORDER — MIRABEGRON 25 MG/1
TABLET, FILM COATED, EXTENDED RELEASE ORAL
COMMUNITY
Start: 2024-08-04

## 2024-08-22 RX ADMIN — REGADENOSON 0.4 MG: 0.08 INJECTION, SOLUTION INTRAVENOUS at 11:29

## 2024-08-22 RX ADMIN — PERFLUTREN 4 ML/MIN: 6.52 INJECTION, SUSPENSION INTRAVENOUS at 10:07

## 2024-08-23 ENCOUNTER — TELEPHONE (OUTPATIENT)
Age: 84
End: 2024-08-23

## 2024-08-23 NOTE — TELEPHONE ENCOUNTER
"Received call from Talisha from Dr. Joshi's (pcp) office in [place] stating Dr. Joshi sent in a \"ask a doc\" message to cardiology regarding this pt and Dr. Baltazar Tavera's recommended a stress test and an echo to be done. Pt had the tests done on 8/22. Dr. Joshi would like a Cardiologist to review the tests and determine what the pt's next steps should be. Pt has never seen one of our Cardiologist in the past.     Please review and advise.     Dr. Joshi's office callback number: [phone].   "

## 2024-08-23 NOTE — TELEPHONE ENCOUNTER
Patient should be seen by cardiology.  If Dr. Baltazar Stacy ordered the tests, then he can see [facility] cardiology next week, since [facility] cardiology will be in clinic next week.

## 2024-08-29 LAB
ARRHY DURING EX: NORMAL
CHEST PAIN STATEMENT: NORMAL
MAX DIASTOLIC BP: 81 MMHG
MAX PREDICTED HEART RATE: 138 BPM
PROTOCOL NAME: NORMAL
REASON FOR TERMINATION: NORMAL
STRESS POST EXERCISE DUR MIN: 3 MIN
STRESS POST EXERCISE DUR SEC: 0 SEC
STRESS POST PEAK HR: 78 BPM
STRESS POST PEAK SYSTOLIC BP: 166 MMHG
TARGET HR FORMULA: NORMAL
TEST INDICATION: NORMAL

## 2024-11-17 NOTE — PROGRESS NOTES
Pulmonary Follow Up Note   Joseph Nath 84 y.o. male MRN: [id number]  11/18/2024    Assessment:  COPD  Noted hyperinflated lungs on chest x-ray  Lung/heavy smoking history quit in 2007  Last exacerbation in 5/2024  Reported chronic bronchitis symptoms with minimally productive cough, dyspnea on exertion  On Trelegy Ellipta  Using PRN DuoNeb no more than once a day    Plan:  Continue Trelegy Ellipta 100  Offered de-escalation to LAMA/LABA however he would like to hold off  Continue PRN albuterol HFA/DuoNeb  Up-to-date on immunization    Obstructive sleep apnea  Compliance report between 8/12 and 11/9/2024-usage above 4 hours 8.9% of the times  Ordered in lab titration study at last visit, however he pursued a home sleep study at [facility]  Explained that home sleep study only to diagnose RAJI, and he would need a titration study for adjustments    Former tobacco abuse  About 50-pack-year quit in 2007  Out of the window for lung cancer screening    Return in about 6 months (around 5/18/2025).    History of Present Illness     Follow up for: COPD        Background  84 y.o. male with a h/o AAA, prostate cancer, osteoarthritis, CKD 3, chronic pain syndrome, DM2, hypothyroidism, generalized anxiety disorder, acid reflux, HTN, class I obesity, osteoporosis, TIA, COPD, RAJI, former tobacco abuse     Hospitalized 5/2024 for COPD with exacerbation.  Presented with viral URI-like symptoms, with acute hypoxic respiratory failure.  Required up to 2 LPM supplemental oxygen.  Treated with IV steroids, weaning nebulized inhalers.  Improved hypoxemia, on room air before discharge did not require oxygen on ambulatory pulse oximetry.     Reports a diagnosis of COPD several years ago via PFT in 2011, has been maintained on Anoro Ellipta since then.  Only 1 hospitalization/steroid use in 5/2024.  No other exacerbations.  Quit smoking 2007.     Reports ongoing daily cough, mainly dry, sometimes with minimal sputum  "production.  Does not use PRN albuterol at all.  Still on the Anoro Ellipta.  Able to walk on a surface level however at a slow pace, climb 1 flight of stairs, feels dyspnea at the end of exercise.  Also was with activity such as mowing the grass, or yard work.      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    10/2024-hospitalized to [facility] for PCI/LETY to LAD/LCx, readmitted a day after for acute STEMI V2-V4, noted to have in-stent thrombosis of the LAD, underwent LETY to the thrombosed stent with POBA to the Lcx      Interval History  Since last seen, had PCI's as above.  Reports no significant difference with using Trelegy compared to Anoro, probably less chest congestion/wheezing.  Otherwise no ED visit, hospitalization, or treatment with oral steroids for COPD.    Reports using the CPAP every night, however remove it frequently, as per the compliance report appeared that he using it only 8% of the times above 4 hours.      Review of Systems  As per hpi, all other systems reviewed and were negative    Studies:    Imaging and other studies: I have personally reviewed pertinent films in PACS      Pulmonary function testing:       EKG, Pathology, and Other Studies: I have personally reviewed pertinent reports.        Past medical, surgical, social and family histories reviewed.      Medications/Allergies: Reviewed      Vitals: Blood pressure 112/68, pulse 87, temperature 98.1 °F (36.7 °C), temperature source Temporal, height 6' 1\" (1.854 m), weight 109 kg (241 lb), SpO2 97%. Body mass index is 31.8 kg/m². Oxygen Therapy  SpO2: 97 %      Physical Exam  Body mass index " "is 31.8 kg/m².   Gen: not in acute distress, obese  Neck/Eyes: supple, PERRL  Ear: normal appearance, no significant hearing impairment  Nose:  normal nasal mucosa, no drainage  Salivary glands: soft nontender  Chest: normal respiratory efforts, diminished but clear breath sounds bilaterally  CV: RRR, no murmurs appreciated, no edema  Abdomen: soft, non tender  Extremities:  No observed deformity   Neuro: AAO X3, no focal motor deficit        Labs:  Lab Results   Component Value Date    WBC 8.93 05/27/2024    HGB 10.7 (L) 05/27/2024    HCT 32.0 (L) 05/27/2024    MCV 90 05/27/2024     05/27/2024     Lab Results   Component Value Date    CALCIUM 9.4 10/22/2024    K 4.2 10/22/2024    CO2 22 10/22/2024    CL 98 10/22/2024    BUN 17 10/22/2024    CREATININE 1 10/22/2024     No results found for: \"IGE\"  Lab Results   Component Value Date    ALT 14 10/19/2024    AST 19 10/19/2024    ALKPHOS 69 10/19/2024           Portions of the record may have been created with voice recognition software.  Occasional wrong word or \"sound a like\" substitutions may have occurred due to the inherent limitations of voice recognition software.  Read the chart carefully and recognize, using context, where substitutions have occurred    Bakari Sparks M.D.  [facility]  Answers submitted by the patient for this visit:  Pulmonology Questionnaire (Submitted on 11/17/2024)  Chief Complaint: Primary symptoms  Do you have shortness of breath that occurs with effort or exertion?: Yes    "

## 2024-11-18 ENCOUNTER — OFFICE VISIT (OUTPATIENT)
Dept: PULMONOLOGY | Facility: CLINIC | Age: 84
End: 2024-11-18

## 2024-11-18 ENCOUNTER — TELEPHONE (OUTPATIENT)
Dept: PULMONOLOGY | Facility: CLINIC | Age: 84
End: 2024-11-18

## 2024-11-18 VITALS
HEART RATE: 87 BPM | DIASTOLIC BLOOD PRESSURE: 68 MMHG | OXYGEN SATURATION: 97 % | BODY MASS INDEX: 31.94 KG/M2 | WEIGHT: 241 LBS | SYSTOLIC BLOOD PRESSURE: 112 MMHG | TEMPERATURE: 98.1 F | HEIGHT: 73 IN

## 2024-11-18 DIAGNOSIS — J44.9 CHRONIC OBSTRUCTIVE PULMONARY DISEASE, UNSPECIFIED COPD TYPE (HCC): Primary | ICD-10-CM

## 2024-11-18 DIAGNOSIS — E66.01 SEVERE OBESITY WITH BODY MASS INDEX (BMI) OF 35.0 TO 39.9 WITH SERIOUS COMORBIDITY (HCC): ICD-10-CM

## 2024-11-18 DIAGNOSIS — G47.33 OSA (OBSTRUCTIVE SLEEP APNEA): ICD-10-CM

## 2024-11-18 RX ORDER — NITROGLYCERIN 0.4 MG/1
TABLET SUBLINGUAL
COMMUNITY
Start: 2024-09-26

## 2024-11-18 RX ORDER — OXYBUTYNIN CHLORIDE 5 MG/1
5 TABLET ORAL 3 TIMES DAILY
COMMUNITY

## 2024-11-18 RX ORDER — FEXOFENADINE HCL 180 MG/1
180 TABLET ORAL DAILY
COMMUNITY

## 2024-11-18 RX ORDER — PREDNISONE 20 MG/1
TABLET ORAL
COMMUNITY
Start: 2024-11-01

## 2024-11-18 RX ORDER — IPRATROPIUM BROMIDE AND ALBUTEROL SULFATE 2.5; .5 MG/3ML; MG/3ML
SOLUTION RESPIRATORY (INHALATION)
COMMUNITY

## 2024-11-18 RX ORDER — ISOSORBIDE MONONITRATE 30 MG/1
30 TABLET, EXTENDED RELEASE ORAL DAILY
COMMUNITY
Start: 2024-09-05

## 2024-11-18 RX ORDER — ASPIRIN 81 MG/1
81 TABLET, CHEWABLE ORAL DAILY
COMMUNITY

## 2024-11-18 RX ORDER — VALSARTAN 40 MG/1
40 TABLET ORAL DAILY
COMMUNITY
Start: 2024-11-12

## 2024-11-18 RX ORDER — ISOSORBIDE MONONITRATE 30 MG/1
1 TABLET, EXTENDED RELEASE ORAL EVERY MORNING
COMMUNITY

## 2024-11-18 NOTE — TELEPHONE ENCOUNTER
Called and left message at home sleep to contact office as patient had home sleep study and need  to have oxygen titrate study done.     Called [phone] and left message

## 2024-12-05 ENCOUNTER — HOSPITAL ENCOUNTER (OUTPATIENT)
Dept: NON INVASIVE DIAGNOSTICS | Facility: HOSPITAL | Age: 84
Discharge: HOME | End: 2024-12-05
Payer: MEDICARE

## 2024-12-05 VITALS
BODY MASS INDEX: 31.94 KG/M2 | WEIGHT: 241 LBS | SYSTOLIC BLOOD PRESSURE: 112 MMHG | HEART RATE: 60 BPM | DIASTOLIC BLOOD PRESSURE: 68 MMHG | HEIGHT: 73 IN

## 2024-12-05 DIAGNOSIS — I21.02 ST ELEVATION (STEMI) MYOCARDIAL INFARCTION INVOLVING LEFT ANTERIOR DESCENDING CORONARY ARTERY (HCC): ICD-10-CM

## 2024-12-05 DIAGNOSIS — E11.9 TYPE 2 DIABETES MELLITUS WITHOUT COMPLICATIONS (HCC): ICD-10-CM

## 2024-12-05 DIAGNOSIS — I50.20 UNSPECIFIED SYSTOLIC (CONGESTIVE) HEART FAILURE (HCC): ICD-10-CM

## 2024-12-05 DIAGNOSIS — E78.5 HYPERLIPIDEMIA, UNSPECIFIED: ICD-10-CM

## 2024-12-05 DIAGNOSIS — I10 ESSENTIAL (PRIMARY) HYPERTENSION: ICD-10-CM

## 2024-12-05 DIAGNOSIS — I25.5 ISCHEMIC CARDIOMYOPATHY: ICD-10-CM

## 2024-12-05 LAB
APICAL FOUR CHAMBER EJECTION FRACTION: 51 %
BSA FOR ECHO PROCEDURE: 2.33 M2
LEFT VENTRICLE DIASTOLIC VOLUME (MOD BIPLANE): 140 ML
LEFT VENTRICLE DIASTOLIC VOLUME INDEX (MOD BIPLANE): 60.1 ML/M2
LEFT VENTRICLE SYSTOLIC VOLUME (MOD BIPLANE): 67 ML
LEFT VENTRICLE SYSTOLIC VOLUME INDEX (MOD BIPLANE): 28.8 ML/M2
LV EF: 52 %
SL CV LV EF: 55
TR MAX PG: 19 MMHG
TR PEAK VELOCITY: 2.2 M/S
TRICUSPID VALVE PEAK REGURGITATION VELOCITY: 2.19 M/S

## 2024-12-05 PROCEDURE — 93308 TTE F-UP OR LMTD: CPT | Performed by: INTERNAL MEDICINE

## 2024-12-05 PROCEDURE — 93321 DOPPLER ECHO F-UP/LMTD STD: CPT

## 2024-12-05 PROCEDURE — 93325 DOPPLER ECHO COLOR FLOW MAPG: CPT | Performed by: INTERNAL MEDICINE

## 2024-12-05 PROCEDURE — 93308 TTE F-UP OR LMTD: CPT

## 2024-12-05 PROCEDURE — 93321 DOPPLER ECHO F-UP/LMTD STD: CPT | Performed by: INTERNAL MEDICINE

## 2024-12-05 PROCEDURE — 93325 DOPPLER ECHO COLOR FLOW MAPG: CPT

## 2025-02-02 DIAGNOSIS — G47.33 OSA (OBSTRUCTIVE SLEEP APNEA): ICD-10-CM

## 2025-02-02 DIAGNOSIS — J44.9 CHRONIC OBSTRUCTIVE PULMONARY DISEASE, UNSPECIFIED (HCC): ICD-10-CM

## 2025-02-04 RX ORDER — FLUTICASONE FUROATE, UMECLIDINIUM BROMIDE AND VILANTEROL TRIFENATATE 100; 62.5; 25 UG/1; UG/1; UG/1
POWDER RESPIRATORY (INHALATION)
Qty: 180 BLISTER | Refills: 3 | Status: SHIPPED | OUTPATIENT
Start: 2025-02-04

## 2025-03-03 RX ORDER — DEXTROMETHORPHAN HYDROBROMIDE AND PROMETHAZINE HYDROCHLORIDE 15; 6.25 MG/5ML; MG/5ML
SYRUP ORAL
COMMUNITY
Start: 2025-01-02

## 2025-03-28 ENCOUNTER — HOSPITAL ENCOUNTER (EMERGENCY)
Facility: HOSPITAL | Age: 85
Discharge: HOME | End: 2025-03-28
Attending: EMERGENCY MEDICINE
Payer: MEDICARE

## 2025-03-28 ENCOUNTER — APPOINTMENT (EMERGENCY)
Dept: CT IMAGING | Facility: HOSPITAL | Age: 85
End: 2025-03-28
Payer: MEDICARE

## 2025-03-28 ENCOUNTER — APPOINTMENT (EMERGENCY)
Dept: RADIOLOGY | Facility: HOSPITAL | Age: 85
End: 2025-03-28
Payer: MEDICARE

## 2025-03-28 VITALS
SYSTOLIC BLOOD PRESSURE: 150 MMHG | HEIGHT: 73 IN | DIASTOLIC BLOOD PRESSURE: 73 MMHG | WEIGHT: 220.68 LBS | BODY MASS INDEX: 29.25 KG/M2 | OXYGEN SATURATION: 98 % | HEART RATE: 62 BPM | RESPIRATION RATE: 18 BRPM

## 2025-03-28 DIAGNOSIS — R91.1 LUNG NODULE: Primary | ICD-10-CM

## 2025-03-28 LAB
2HR DELTA HS TROPONIN: 3 NG/L
ALBUMIN SERPL BCG-MCNC: 3.7 G/DL (ref 3.5–5)
ALP SERPL-CCNC: 81 U/L (ref 34–104)
ALT SERPL W P-5'-P-CCNC: 21 U/L (ref 7–52)
ANION GAP SERPL CALCULATED.3IONS-SCNC: 9 MMOL/L (ref 4–13)
AST SERPL W P-5'-P-CCNC: 21 U/L (ref 13–39)
ATRIAL RATE: 75 BPM
BASOPHILS # BLD AUTO: 0.04 THOUSANDS/ÂΜL (ref 0–0.1)
BASOPHILS NFR BLD AUTO: 0 % (ref 0–1)
BILIRUB SERPL-MCNC: 0.6 MG/DL (ref 0.2–1)
BNP SERPL-MCNC: 196 PG/ML (ref 0–100)
BUN SERPL-MCNC: 30 MG/DL (ref 5–25)
CALCIUM SERPL-MCNC: 9 MG/DL (ref 8.4–10.2)
CARDIAC TROPONIN I PNL SERPL HS: 26 NG/L (ref ?–50)
CARDIAC TROPONIN I PNL SERPL HS: 29 NG/L (ref ?–50)
CHLORIDE SERPL-SCNC: 98 MMOL/L (ref 96–108)
CO2 SERPL-SCNC: 29 MMOL/L (ref 21–32)
CREAT SERPL-MCNC: 1.44 MG/DL (ref 0.6–1.3)
EOSINOPHIL # BLD AUTO: 0.24 THOUSAND/ÂΜL (ref 0–0.61)
EOSINOPHIL NFR BLD AUTO: 2 % (ref 0–6)
ERYTHROCYTE [DISTWIDTH] IN BLOOD BY AUTOMATED COUNT: 14.7 % (ref 11.6–15.1)
GFR SERPL CREATININE-BSD FRML MDRD: 44 ML/MIN/1.73SQ M
GLUCOSE SERPL-MCNC: 179 MG/DL (ref 65–140)
HCT VFR BLD AUTO: 32.5 % (ref 36.5–49.3)
HGB BLD-MCNC: 10.8 G/DL (ref 12–17)
IMM GRANULOCYTES # BLD AUTO: 0.05 THOUSAND/UL (ref 0–0.2)
IMM GRANULOCYTES NFR BLD AUTO: 1 % (ref 0–2)
LACTATE SERPL-SCNC: 1 MMOL/L (ref 0.5–2)
LYMPHOCYTES # BLD AUTO: 1.92 THOUSANDS/ÂΜL (ref 0.6–4.47)
LYMPHOCYTES NFR BLD AUTO: 17 % (ref 14–44)
MAGNESIUM SERPL-MCNC: 2 MG/DL (ref 1.9–2.7)
MCH RBC QN AUTO: 29.7 PG (ref 26.8–34.3)
MCHC RBC AUTO-ENTMCNC: 33.2 G/DL (ref 31.4–37.4)
MCV RBC AUTO: 89 FL (ref 82–98)
MONOCYTES # BLD AUTO: 1.33 THOUSAND/ÂΜL (ref 0.17–1.22)
MONOCYTES NFR BLD AUTO: 12 % (ref 4–12)
NEUTROPHILS # BLD AUTO: 7.49 THOUSANDS/ÂΜL (ref 1.85–7.62)
NEUTS SEG NFR BLD AUTO: 68 % (ref 43–75)
NRBC BLD AUTO-RTO: 0 /100 WBCS
P AXIS: 73 DEGREES
PLATELET # BLD AUTO: 391 THOUSANDS/UL (ref 149–390)
PMV BLD AUTO: 9.7 FL (ref 8.9–12.7)
POTASSIUM SERPL-SCNC: 3.2 MMOL/L (ref 3.5–5.3)
PR INTERVAL: 226 MS
PROT SERPL-MCNC: 7.2 G/DL (ref 6.4–8.4)
QRS AXIS: 68 DEGREES
QRSD INTERVAL: 88 MS
QT INTERVAL: 408 MS
QTC INTERVAL: 455 MS
RBC # BLD AUTO: 3.64 MILLION/UL (ref 3.88–5.62)
SODIUM SERPL-SCNC: 136 MMOL/L (ref 135–147)
T WAVE AXIS: 136 DEGREES
VENTRICULAR RATE: 75 BPM
WBC # BLD AUTO: 11.07 THOUSAND/UL (ref 4.31–10.16)

## 2025-03-28 PROCEDURE — 83605 ASSAY OF LACTIC ACID: CPT | Performed by: EMERGENCY MEDICINE

## 2025-03-28 PROCEDURE — 93010 ELECTROCARDIOGRAM REPORT: CPT | Performed by: INTERNAL MEDICINE

## 2025-03-28 PROCEDURE — 71250 CT THORAX DX C-: CPT

## 2025-03-28 PROCEDURE — 83880 ASSAY OF NATRIURETIC PEPTIDE: CPT | Performed by: EMERGENCY MEDICINE

## 2025-03-28 PROCEDURE — 80053 COMPREHEN METABOLIC PANEL: CPT | Performed by: EMERGENCY MEDICINE

## 2025-03-28 PROCEDURE — 99284 EMERGENCY DEPT VISIT MOD MDM: CPT

## 2025-03-28 PROCEDURE — 71045 X-RAY EXAM CHEST 1 VIEW: CPT

## 2025-03-28 PROCEDURE — 36415 COLL VENOUS BLD VENIPUNCTURE: CPT | Performed by: EMERGENCY MEDICINE

## 2025-03-28 PROCEDURE — 84484 ASSAY OF TROPONIN QUANT: CPT | Performed by: EMERGENCY MEDICINE

## 2025-03-28 PROCEDURE — 85025 COMPLETE CBC W/AUTO DIFF WBC: CPT | Performed by: EMERGENCY MEDICINE

## 2025-03-28 PROCEDURE — 93005 ELECTROCARDIOGRAM TRACING: CPT

## 2025-03-28 PROCEDURE — 83735 ASSAY OF MAGNESIUM: CPT | Performed by: EMERGENCY MEDICINE

## 2025-03-28 PROCEDURE — 99285 EMERGENCY DEPT VISIT HI MDM: CPT | Performed by: EMERGENCY MEDICINE

## 2025-03-28 NOTE — ED PROVIDER NOTES
Time reflects when diagnosis was documented in both MDM as applicable and the Disposition within this note       Time User Action Codes Description Comment    3/28/2025  8:03 PM Mateus Wray Add [R91.1] Lung nodule           ED Disposition       ED Disposition   Discharge    Condition   Stable    Date/Time   Fri Mar 28, 2025  8:03 PM    Comment   Joseph Nath discharge to home/self care.                   Assessment & Plan       Medical Decision Making  Based on the history and medical screening exam performed the patient may be at risk for CHF, ACS, STEMI, NSTEMI, normal postsurgical change.    Based on the work-up performed in the emergency room which includes physical examination, and which may include laboratory studies and imaging as warranted including advanced imaging such as CT scan or ultrasound, the diagnostic considerations are narrowed to exclude limb or life-threatening process.    The patient is stable for discharge.  Patient has negative workup in the emergency department.  Remains afebrile without chest pain or shortness of breath throughout ED stay.  EKG negative.  Chest x-ray revealed questionable opacity left lung, therefore CT chest was obtained.  However CT chest was negative revealing only expected postsurgical change.  Lab work not consistent with infection.  Incidental finding of pulmonary nodule was discussed with patient.  Patient advised to follow-up as scheduled with primary care physician and cardiology.  Careful return instructions provided.    Amount and/or Complexity of Data Reviewed  Labs: ordered. Decision-making details documented in ED Course.     Details: Creatinine 1.4, only mildly elevated nonspecific, otherwise labs benign  Radiology: ordered and independent interpretation performed. Decision-making details documented in ED Course.     Details: Initial chest x-ray with questionable left-sided opacity but follow-up CT chest negative  ECG/medicine tests: ordered and  independent interpretation performed. Decision-making details documented in ED Course.     Details: Normal sinus rhythm rate 75        ED Course as of 03/28/25 2010   Fri Mar 28, 2025   1955 Patient remains hemodynamically stable without chest pain or shortness of breath while in the emergency room.  Mild creatinine elevation is nonspecific.  Patient has no findings to indicate infection.  CT of the chest reveals only postsurgical changes.  Discussed finding of lung nodule with the patient.  Discussed finding of spinal compression fractures with the patient.  Advised to follow-up with primary care physician and cardiologist.  Patient is agreeable with plan       Medications - No data to display    ED Risk Strat Scores                            SBIRT 22yo+      Flowsheet Row Most Recent Value   Initial Alcohol Screen: US AUDIT-C     1. How often do you have a drink containing alcohol? 0 Filed at: 03/28/2025 1633   2. How many drinks containing alcohol do you have on a typical day you are drinking?  0 Filed at: 03/28/2025 1633   3b. FEMALE Any Age, or MALE 65+: How often do you have 4 or more drinks on one occassion? 0 Filed at: 03/28/2025 1633   Audit-C Score 0 Filed at: 03/28/2025 1633   DEAN: How many times in the past year have you...    Used an illegal drug or used a prescription medication for non-medical reasons? Never Filed at: 03/28/2025 1633                            History of Present Illness       Chief Complaint   Patient presents with    Leg Swelling     Pt reports weight gain over the evening of 2lbs, reprots lower leg swelling- pt also reports BP being in the 90's systolic on arrival        Past Medical History:   Diagnosis Date    AAA (abdominal aortic aneurysm) (HCC)     Arthritis     Chronic kidney disease     Chronic pain disorder     low back    Colon polyp     COPD (chronic obstructive pulmonary disease) (HCC)     CPAP (continuous positive airway pressure) dependence     Diabetes (HCC)      Diabetes mellitus (HCC)     Disease of thyroid gland     Dyslipidemia     MARY (generalized anxiety disorder)     GERD (gastroesophageal reflux disease)     Hyperlipidemia     Hypertension     Obesity     Osteoporosis     Shortness of breath     Sleep apnea     TIA (transient ischemic attack)     Wears dentures       Past Surgical History:   Procedure Laterality Date    BACK SURGERY  2009    COLONOSCOPY      EPIDURAL BLOCK INJECTION N/A 9/15/2020    Procedure: L5 S1 Lumbar Epidural Steroid Injection (19761);  Surgeon: Truong Triana MD;  Location: [facility];  Service: Pain Management     FL GUIDED NEEDLE PLAC BX/ASP/INJ  9/15/2020    FRACTURE SURGERY      stress fracture of pelvis    LEG SURGERY        Family History   Problem Relation Age of Onset    Diabetes Mother     Osteoporosis Mother     Heart disease Father       Social History     Tobacco Use    Smoking status: Former     Current packs/day: 0.00     Types: Cigarettes     Quit date: 9/15/2007     Years since quittin.5    Smokeless tobacco: Never   Vaping Use    Vaping status: Never Used   Substance Use Topics    Alcohol use: Not Currently     Comment: rarely    Drug use: Never      E-Cigarette/Vaping    E-Cigarette Use Never User       E-Cigarette/Vaping Substances      I have reviewed and agree with the history as documented.     Patient had triple bypass CABG 2 weeks ago, noted to have 3 pound weight gain over the past day, advised by PCP to come to the ER for further evaluation.    Patient does complain of some mild leg swelling.    However, patient denies chest pain, shortness of breath, nausea or vomiting, abdominal pain, or any other complaint at this time.      History provided by:  Patient   used: No    Medical Problem  Quality:  Reported weight gain, leg swelling  Severity:  Mild  Onset quality:  Gradual  Duration:  1 day  Timing:  Constant  Progression:  Unchanged  Chronicity:  New  Context:  Recent CABG  Relieved by:   Nothing  Worsened by:  Nothing  Associated symptoms: no abdominal pain, no chest pain, no cough, no diarrhea, no ear pain, no fever, no headaches, no loss of consciousness, no myalgias, no nausea, no rash, no shortness of breath, no sore throat, no vomiting and no wheezing        Review of Systems   Constitutional:  Negative for chills and fever.   HENT:  Negative for ear pain, hearing loss, sore throat, trouble swallowing and voice change.    Eyes:  Negative for pain and discharge.   Respiratory:  Negative for cough, shortness of breath and wheezing.    Cardiovascular:  Negative for chest pain and palpitations.   Gastrointestinal:  Negative for abdominal pain, blood in stool, constipation, diarrhea, nausea and vomiting.   Genitourinary:  Negative for dysuria, flank pain, frequency and hematuria.   Musculoskeletal:  Negative for joint swelling, myalgias, neck pain and neck stiffness.   Skin:  Negative for rash and wound.   Neurological:  Negative for dizziness, seizures, loss of consciousness, syncope, facial asymmetry and headaches.   Psychiatric/Behavioral:  Negative for hallucinations, self-injury and suicidal ideas.    All other systems reviewed and are negative.          Objective       ED Triage Vitals [03/28/25 1633]   Temp Pulse Blood Pressure Respirations SpO2 Patient Position - Orthostatic VS   -- 77 116/60 18 96 % Sitting      Temp src Heart Rate Source BP Location FiO2 (%) Pain Score    -- Monitor Right arm -- --      Vitals      Date and Time Temp Pulse SpO2 Resp BP Pain Score FACES Pain Rating User   03/28/25 1915 -- 62 98 % 18 150/73 -- -- NM   03/28/25 1900 -- 83 96 % 20 -- -- -- NM   03/28/25 1633 -- 77 96 % 18 116/60 -- -- SS            Physical Exam  Vitals and nursing note reviewed.   Constitutional:       General: He is not in acute distress.     Appearance: He is well-developed.   HENT:      Head: Normocephalic and atraumatic.      Right Ear: External ear normal.      Left Ear: External ear  normal.   Eyes:      General: No scleral icterus.        Right eye: No discharge.         Left eye: No discharge.      Extraocular Movements: Extraocular movements intact.      Conjunctiva/sclera: Conjunctivae normal.   Cardiovascular:      Rate and Rhythm: Normal rate and regular rhythm.      Heart sounds: Normal heart sounds. No murmur heard.  Pulmonary:      Effort: Pulmonary effort is normal.      Breath sounds: Normal breath sounds. No wheezing or rales.   Chest:      Chest wall: No tenderness.      Comments: Well-healing median sternotomy scar without evidence of infection.  Abdominal:      General: Bowel sounds are normal. There is no distension.      Palpations: Abdomen is soft.      Tenderness: There is no abdominal tenderness. There is no guarding or rebound.   Musculoskeletal:         General: No deformity. Normal range of motion.      Cervical back: Normal range of motion and neck supple.      Right lower leg: No edema.      Left lower leg: No edema.   Skin:     General: Skin is warm and dry.      Coloration: Skin is not jaundiced.      Findings: No bruising or rash.   Neurological:      General: No focal deficit present.      Mental Status: He is alert and oriented to person, place, and time.      Cranial Nerves: No cranial nerve deficit.   Psychiatric:         Mood and Affect: Mood normal.         Behavior: Behavior normal.         Thought Content: Thought content normal.         Judgment: Judgment normal.         Results Reviewed       Procedure Component Value Units Date/Time    HS Troponin I 2hr [[id number]]  (Normal) Collected: 03/28/25 1920    Lab Status: Final result Specimen: Blood from Arm, Right Updated: 03/28/25 1949     hs TnI 2hr 29 ng/L      Delta 2hr hsTnI 3 ng/L     HS Troponin I 4hr [[id number]]     Lab Status: No result Specimen: Blood     Comprehensive metabolic panel [[id number]]  (Abnormal) Collected: 03/28/25 1751    Lab Status: Final result Specimen: Blood from Arm, Right Updated:  03/28/25 1812     Sodium 136 mmol/L      Potassium 3.2 mmol/L      Chloride 98 mmol/L      CO2 29 mmol/L      ANION GAP 9 mmol/L      BUN 30 mg/dL      Creatinine 1.44 mg/dL      Glucose 179 mg/dL      Calcium 9.0 mg/dL      AST 21 U/L      ALT 21 U/L      Alkaline Phosphatase 81 U/L      Total Protein 7.2 g/dL      Albumin 3.7 g/dL      Total Bilirubin 0.60 mg/dL      eGFR 44 ml/min/1.73sq m     Narrative:      [employer] Kid[employer] guidelines for Chronic Kidney Disease (CKD):     Stage 1 with normal or high GFR (GFR > 90 mL/min/1.73 square meters)    Stage 2 Mild CKD (GFR = 60-89 mL/min/1.73 square meters)    Stage 3A Moderate CKD (GFR = 45-59 mL/min/1.73 square meters)    Stage 3B Moderate CKD (GFR = 30-44 mL/min/1.73 square meters)    Stage 4 Severe CKD (GFR = 15-29 mL/min/1.73 square meters)    Stage 5 End Stage CKD (GFR <15 mL/min/1.73 square meters)  Note: GFR calculation is accurate only with a steady state creatinine    Magnesium [[id number]]  (Normal) Collected: 03/28/25 1751    Lab Status: Final result Specimen: Blood from Arm, Right Updated: 03/28/25 1812     Magnesium 2.0 mg/dL     HS Troponin 0hr (reflex protocol) [[id number]]  (Normal) Collected: 03/28/25 1722    Lab Status: Final result Specimen: Blood from Arm, Left Updated: 03/28/25 1751     hs TnI 0hr 26 ng/L     B-Type Natriuretic Peptide(BNP) [[id number]]  (Abnormal) Collected: 03/28/25 1722    Lab Status: Final result Specimen: Blood from Arm, Left Updated: 03/28/25 1749      pg/mL     Lactic acid, plasma (w/reflex if result > 2.0) [[id number]]  (Normal) Collected: 03/28/25 1722    Lab Status: Final result Specimen: Blood from Arm, Left Updated: 03/28/25 1742     LACTIC ACID 1.0 mmol/L     Narrative:      Result may be elevated if tourniquet was used during collection.    CBC and differential [[id number]]  (Abnormal) Collected: 03/28/25 1722    Lab Status: Final result Specimen: Blood from Arm, Left Updated: 03/28/25 1728     WBC  11.07 Thousand/uL      RBC 3.64 Million/uL      Hemoglobin 10.8 g/dL      Hematocrit 32.5 %      MCV 89 fL      MCH 29.7 pg      MCHC 33.2 g/dL      RDW 14.7 %      MPV 9.7 fL      Platelets 391 Thousands/uL      nRBC 0 /100 WBCs      Segmented % 68 %      Immature Grans % 1 %      Lymphocytes % 17 %      Monocytes % 12 %      Eosinophils Relative 2 %      Basophils Relative 0 %      Absolute Neutrophils 7.49 Thousands/µL      Absolute Immature Grans 0.05 Thousand/uL      Absolute Lymphocytes 1.92 Thousands/µL      Absolute Monocytes 1.33 Thousand/µL      Eosinophils Absolute 0.24 Thousand/µL      Basophils Absolute 0.04 Thousands/µL             CT chest without contrast   Final Interpretation by Harlan Waters MD (03/28 1939)      1.  Postsurgical changes related to recent median sternotomy for coronary artery bypass graft, with a small amount of nonspecific fluid and gas seen deep to the sternotomy site. While this could be related to evolving postsurgical changes, in the    appropriate clinical setting, infection is not excluded. Correlate with clinical symptomatology and laboratory markers for infection/inflammation.   2.  There is a 5 mm nodule in the lingula. Per Fleischner criteria, recommend follow-up chest CT in 12 months.   3.  Irregularity with discontinuity of the left first and second ribs, which could be related to recent thoracotomy versus represent fracture. Correlate with recent procedure.   4.  Age-indeterminate mild superior endplate compression fractures of T6 and L1. Partially visualized L2 vertebral body, where there is an age-indeterminate compression fracture and vertebroplasty material.      The study was marked in EPIC for immediate notification.         Workstation performed: [id number]         XR chest portable    (Results Pending)       ECG 12 Lead Documentation Only    Date/Time: 3/28/2025 4:38 PM    Performed by: Mateus Wray MD  Authorized by: Mateus Wray MD    ECG  reviewed by me, the ED Provider: yes    Patient location:  ED  Previous ECG:     Previous ECG:  Unavailable  Interpretation:     Interpretation: normal    Rate:     ECG rate:  75    ECG rate assessment: normal    Rhythm:     Rhythm: sinus rhythm    Ectopy:     Ectopy: none    QRS:     QRS axis:  Normal    QRS intervals:  Normal  Conduction:     Conduction: normal    ST segments:     ST segments:  Normal  T waves:     T waves: normal        ED Medication and Procedure Management   Prior to Admission Medications   Prescriptions Last Dose Informant Patient Reported? Taking?   Cholecalciferol (Vitamin D-3) 25 MCG (1000 UT) CAPS   Yes No   Sig: Take by mouth   Coenzyme Q10 (CoQ10) 100 MG CAPS   Yes No   Sig: Take by mouth   Empagliflozin (JARDIANCE) 10 MG TABS tablet   Yes No   Sig: Take 10 mg by mouth daily   Flomax 0.4 MG   Yes No   Sig: Take 0.4 mg by mouth daily with dinner   Januvia 100 MG tablet   Yes No   Lancets (freestyle) lancets   Yes No   Sig: FreeStyle Lancets 28 gauge   Polyethylene Glycol 3350 POWD   Yes No   Sig: Use   Trelegy Ellipta 100-62.5-25 MCG/ACT inhaler   No No   Sig: USE 1 INHALATION DAILY (RINSE MOUTH AFTER USE)   albuterol (PROVENTIL HFA,VENTOLIN HFA) 90 mcg/act inhaler   Yes No   Sig: Inhale 2 puffs every 6 (six) hours as needed for wheezing   aspirin 81 mg chewable tablet   Yes No   Sig: Chew 81 mg daily   atorvastatin (LIPITOR) 20 mg tablet   Yes No   Sig: Take 20 mg by mouth daily   betamethasone dipropionate (DIPROSONE) 0.05 % cream   Yes No   Sig: Apply topically 2 (two) times a day   carvedilol (COREG) 3.125 mg tablet   Yes No   Sig: Take 3.125 mg by mouth 2 (two) times a day with meals   denosumab (PROLIA) 60 mg/mL   Yes No   Sig: Inject 60 mg under the skin once   fexofenadine (ALLEGRA) 180 MG tablet   Yes No   Sig: Take 180 mg by mouth daily   gabapentin (NEURONTIN) 100 mg capsule   No No   Sig: Take 1 capsule (100 mg total) by mouth daily at bedtime   glimepiride (AMARYL) 1 mg  tablet   Yes No   Sig: Take 1 mg by mouth every morning before breakfast   ipratropium-albuterol (DUO-NEB) 0.5-2.5 mg/3 mL nebulizer solution   Yes No   isosorbide mononitrate (IMDUR) 30 mg 24 hr tablet   Yes No   Sig: Take 1 tablet by mouth every morning   Patient not taking: Reported on 11/18/2024   isosorbide mononitrate (IMDUR) 30 mg 24 hr tablet   Yes No   Sig: Take 30 mg by mouth daily   levothyroxine 200 mcg tablet   Yes No   Sig: Take 200 mcg by mouth daily   nitroglycerin (NITROSTAT) 0.4 mg SL tablet   Yes No   Sig: PLACE 1 TABLET UNDER THE TONGUE EVERY 5 MINUTES AS NEEDED FOR CHEST PAIN   oxybutynin (DITROPAN) 5 mg tablet   Yes No   Sig: Take 5 mg by mouth 3 (three) times a day   predniSONE 20 mg tablet   Yes No   promethazine-dextromethorphan (PHENERGAN-DM) 6.25-15 mg/5 mL oral syrup   Yes No   Sig: take 5 ml by mouth 4 times a day as needed for cough   sertraline (ZOLOFT) 50 mg tablet   Yes No   Sig: Take 50 mg by mouth daily   solifenacin (VESICARE) 5 mg tablet   Yes No   Sig: Take 5 mg by mouth daily   Patient not taking: Reported on 11/18/2024   ticagrelor (BRILINTA) 90 MG   Yes No   Sig: Take 90 mg by mouth 2 (two) times a day   valsartan (DIOVAN) 40 mg tablet   Yes No   Sig: Take 40 mg by mouth daily      Facility-Administered Medications: None     Patient's Medications   Discharge Prescriptions    No medications on file     No discharge procedures on file.  ED SEPSIS DOCUMENTATION   Time reflects when diagnosis was documented in both MDM as applicable and the Disposition within this note       Time User Action Codes Description Comment    3/28/2025  8:03 PM Mateus Wray [R91.1] Lung nodule                  Mateus Wray MD  03/28/25 2010

## 2025-03-29 NOTE — DISCHARGE INSTRUCTIONS
The CT scan performed today had the following findings:  1.  Postsurgical changes related to recent median sternotomy for coronary artery bypass graft, with a small amount of nonspecific fluid and gas seen deep to the sternotomy site. While this could be related to evolving postsurgical changes, in the   appropriate clinical setting, infection is not excluded. Correlate with clinical symptomatology and laboratory markers for infection/inflammation.  2.  There is a 5 mm nodule in the lingula. Per Fleischner criteria, recommend follow-up chest CT in 12 months.  3.  Irregularity with discontinuity of the left first and second ribs, which could be related to recent thoracotomy versus represent fracture. Correlate with recent procedure.  4.  Age-indeterminate mild superior endplate compression fractures of T6 and L1. Partially visualized L2 vertebral body, where there is an age-indeterminate compression fracture and vertebroplasty material.    Please discuss these findings with your primary care physician and/or cardiologist.    The lung nodule in the lingula should be reevaluated in 12 months or sooner if directed by her primary care physician.

## 2025-05-06 NOTE — PROGRESS NOTES
" Nutrition Assessment Form    Patient Name: Joseph Nath    YOB: 1940    Sex: Male     Assessment Date: 5/6/2025  Start Time: 11 AM Stop Time: 12:15 PM Total Minutes: 75 min     Data:  Present at session: self and wife Omayra   Parent/Patient Concerns/reason for visit: \"He had a triple bypass.\"    Medical Dx/Reason for Referral: Type 2 DM, HF, HTN   Past Medical History:   Diagnosis Date    AAA (abdominal aortic aneurysm) ([facility])     Arthritis     Chronic kidney disease     Chronic pain disorder     low back    Colon polyp     COPD (chronic obstructive pulmonary disease) (HCC)     CPAP (continuous positive airway pressure) dependence     Diabetes (HCC)     Diabetes mellitus (HCC)     Disease of thyroid gland     Dyslipidemia     MARY (generalized anxiety disorder)     GERD (gastroesophageal reflux disease)     Hyperlipidemia     Hypertension     Obesity     Osteoporosis     Shortness of breath     Sleep apnea     TIA (transient ischemic attack)     Wears dentures         S/P CABG x 3 - Primary   Postsurgical aortocoronary bypass status    Current Outpatient Medications   Medication Sig Dispense Refill    albuterol (PROVENTIL HFA,VENTOLIN HFA) 90 mcg/act inhaler Inhale 2 puffs every 6 (six) hours as needed for wheezing      aspirin 81 mg chewable tablet Chew 81 mg daily      atorvastatin (LIPITOR) 20 mg tablet Take 20 mg by mouth daily      betamethasone dipropionate (DIPROSONE) 0.05 % cream Apply topically 2 (two) times a day      carvedilol (COREG) 3.125 mg tablet Take 3.125 mg by mouth 2 (two) times a day with meals      Cholecalciferol (Vitamin D-3) 25 MCG (1000 UT) CAPS Take by mouth      Coenzyme Q10 (CoQ10) 100 MG CAPS Take by mouth      denosumab (PROLIA) 60 mg/mL Inject 60 mg under the skin once      Empagliflozin (JARDIANCE) 10 MG TABS tablet Take 10 mg by mouth daily      fexofenadine (ALLEGRA) 180 MG tablet Take 180 mg by mouth daily      Flomax 0.4 MG Take 0.4 mg by mouth daily with dinner  " "    gabapentin (NEURONTIN) 100 mg capsule Take 1 capsule (100 mg total) by mouth daily at bedtime      glimepiride (AMARYL) 1 mg tablet Take 1 mg by mouth every morning before breakfast      ipratropium-albuterol (DUO-NEB) 0.5-2.5 mg/3 mL nebulizer solution       isosorbide mononitrate (IMDUR) 30 mg 24 hr tablet Take 1 tablet by mouth every morning (Patient not taking: Reported on 11/18/2024)      isosorbide mononitrate (IMDUR) 30 mg 24 hr tablet Take 30 mg by mouth daily      Januvia 100 MG tablet       Lancets (freestyle) lancets FreeStyle Lancets 28 gauge      levothyroxine 200 mcg tablet Take 200 mcg by mouth daily      nitroglycerin (NITROSTAT) 0.4 mg SL tablet PLACE 1 TABLET UNDER THE TONGUE EVERY 5 MINUTES AS NEEDED FOR CHEST PAIN      oxybutynin (DITROPAN) 5 mg tablet Take 5 mg by mouth 3 (three) times a day      Polyethylene Glycol 3350 POWD Use      predniSONE 20 mg tablet       promethazine-dextromethorphan (PHENERGAN-DM) 6.25-15 mg/5 mL oral syrup take 5 ml by mouth 4 times a day as needed for cough      sertraline (ZOLOFT) 50 mg tablet Take 50 mg by mouth daily      solifenacin (VESICARE) 5 mg tablet Take 5 mg by mouth daily (Patient not taking: Reported on 11/18/2024)      ticagrelor (BRILINTA) 90 MG Take 90 mg by mouth 2 (two) times a day      Trelegy Ellipta 100-62.5-25 MCG/ACT inhaler USE 1 INHALATION DAILY (RINSE MOUTH AFTER USE) 180 blister 3    valsartan (DIOVAN) 40 mg tablet Take 40 mg by mouth daily       No current facility-administered medications for this visit.         Calcium carbonate-vitamin D    Caltrate 600    Uses cPAP    [employer] vitamin D3    Free style lancets with one touch     Levothyroxine    No longer on lasix or diuretic   Additional Meds/Supplements: N/a   Special Learning Needs/barriers to learning/any new barriers N/a   Height: Ht Readings from Last 5 Encounters:   03/28/25 6' 1\" (1.854 m)   12/05/24 6' 1\" (1.854 m)   11/18/24 6' 1\" (1.854 m)   08/22/24 6' 1\" (1.854 m) " "  24 6' 1\" (1.854 m)      Weight: Wt Readings from Last 10 Encounters:   25 100 kg (220 lb 10.9 oz)   24 109 kg (241 lb)   24 109 kg (241 lb)   24 112 kg (246 lb)   24 112 kg (246 lb)   24 113 kg (249 lb)   20 113 kg (250 lb)   09/15/20 113 kg (250 lb)   20 113 kg (250 lb)     Estimated body mass index is 29.12 kg/m² as calculated from the following:    Height as of 3/28/25: 6' 1\" (1.854 m).    Weight as of 3/28/25: 100 kg (220 lb 10.9 oz).   Recent Weight Change: [x]Yes     []No  Amount: Weight loss of 30lb after heart attack      Energy Needs: [place]- St[place] Jeor Equation: 2275 kcal (mifflin x 1.3 activity)   Allergies   Allergen Reactions    Ezetimibe-Simvastatin Myalgia and Other (See Comments)     cramps     cramps    Vytorin    Oxycodone Anxiety     agitation    or intolerances    Social History     Substance and Sexual Activity   Alcohol Use Not Currently    Comment: rarely    rarely   Social History     Tobacco Use   Smoking Status Former    Current packs/day: 0.00    Types: Cigarettes    Quit date: 9/15/2007    Years since quittin.6   Smokeless Tobacco Never       Who shops? 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 [x]Yes--some counseling during cardiac rehab     []No  When:      Why:         Diet Hx:  Breakfast: Diet: 'I'm allowed 2000mg sodium\" Received booklets from cardiac rehab, has been making changes to portion sizes and food types at home (decreasing red meat intake)    Waffle (frozen, decreased to 1) plus butter and SF " "syrup or regular or SF jelly  Coffee with milk and truvia    Or egg, cheese, 1 slice ham and 1 slice bread and peanut butter    Or on Sundays: Isaac alegre 3 sausages with toast whole wheat    Lunch:   Sometimes skips this meal    [place] with swiss and low sodium ham and low sodium canned soup    Or takeout such as chicken parm sandwich         Dinner: \"Fish or chicken fish or chicken\"    Or roast beef once per week (trying to limit red meat)             Snacks: AM - none  PM -   HS - \"That's when I snack, and snack and snack\" sometimes potato chips or 1-2 jumana pb cups or or candy cherry slices or cheetos    Mostly drinks water through the day, sometimes diet juices or apple ~16 oz     Other Notes/ Initial Assessment:  Pt accompanied by wife for nutrition appointment. Reports significant diet changes he has been making, noting 30lb weight loss that pt describes as unintentional, was making diet changes since having CABG x 3. Currently in cardiac rehab. Reports decreasing portion sizes and reducing intake of red meats. Reports BG levels have been variable, between 97 - 140s range in the morning. Pt has limited vegetable intake, was initially describing \"none\" that he likes but eventually identified corn, sweet potatoes, lettuce, peppers, onions.     Updated assessment (Follow up note only):           Nutrition Diagnosis:   Altered Nutrition-Related Laboratory values  related to Kidney, liver, cardiac, endocrine, neurologic, and/or pulmonary dysfunction as evidenced by  Abnormal plasma glucose and/or HgbA1c levels       Any change or new dx since previous visit:       Medical Nutrition Therapy Intervention:  [x]Individualized Meal Plan--Discussed appropriate portions of starchy vegetables. Encouraged nonstarchy vegetable intake of those he enjoys such as lettuce, peppers, onions.     Advised pt to include PRO rich food at breakfast and bedtime snack to aid in glycemic control. Suggested adding peanut butter to waffle " or have with eggs or milk on the side. Suggested harvest snaps chip alternative. Suggested having low sodium lunch meat or hardboiled egg or string cheese with bedtime snack for glycemic control.     Inquired about what to do when eating out at restaurants, often gets cream based soups that he enjoys, suggested having with lean protein entree given saturated fat content of cream in soup. Encouraged having nonstarchy vegetables with restaurant meals as well.  []Understanding Lab Values   []Basic Pathophysiology of Disease []Food/Medication Interactions   []Food Diary []Exercise   []Lifestyle/Behavior Modification Techniques []Medication, Mechanism of Action   [x]Label Reading: Pt inquired about recommended sodium intake, 1500-2000mg appropriate. []Self Blood Glucose Monitoring   [x]Weight/BMI Goals: Given age, could aim for weight of 211lb to obtain BMI of 28.  [x]Other -     Handouts provided: Heart healthy consistent carbohydrate handout, recipes for slow cooker sloppy joes and air fryer salmon    *Pt reports he will call author to schedule follow up appointment as desired.  He has author's contact information.           Comprehension: []Excellent  []Very Good  [x]Good  []Fair   []Poor    Receptivity: []Excellent  []Very Good  [x]Good  []Fair   []Poor    Expected Compliance: []Excellent  []Very Good  [x]Good  []Fair   []Poor        Goals (initial)/ Progress made on previous goals/new goals:  Pt to obtain A1c < 7% upon next lab reading.   2. Pt to include at least 1/2 cup of nonstarchy vegetables daily upon follow up.   3. Pt to include PRO rich food at bedtime snack daily upon follow up.        No follow-ups on file.  Labs:  CMP  Lab Results   Component Value Date    K 3.2 (L) 03/28/2025    CL 98 03/28/2025    CO2 29 03/28/2025    BUN 30 (H) 03/28/2025    CREATININE 1.44 (H) 03/28/2025    CALCIUM 9.9 04/16/2025    AST 21 03/28/2025    ALT 21 03/28/2025    ALKPHOS 81 03/28/2025    EGFR 44 03/28/2025       BMP  Lab  "Results   Component Value Date    CALCIUM 9.9 04/16/2025    K 3.2 (L) 03/28/2025    CO2 29 03/28/2025    CL 98 03/28/2025    BUN 30 (H) 03/28/2025    CREATININE 1.44 (H) 03/28/2025       Lipids  No results found for: \"CHOL\"  No results found for: \"HDL\"  No results found for: \"LDLCALC\"  No results found for: \"TRIG\"  No results found for: \"CHOLHDL\"    Hemoglobin A1C  Lab Results   Component Value Date    HGBA1C 7.3 (H) 03/11/2025       Fasting Glucose  No results found for: \"GLUF\"    Insulin     Thyroid  Lab Results   Component Value Date    TSH 0.09 (L) 03/11/2025       Hepatic Function Panel  Lab Results   Component Value Date    ALT 21 03/28/2025    AST 21 03/28/2025    ALKPHOS 81 03/28/2025       Celiac Disease Antibody Panel  No results found for: \"ENDOMYSIAL IGA\", \"GLIADIN IGA\", \"GLIADIN IGG\", \"IGA\", \"TISSUE TRANSGLUT AB\", \"TTG IGA\"   Iron  No results found for: \"IRON\", \"TIBC\", \"FERRITIN\"         Valeria Chan RD, LDN  [facility]  [address] [place] [id number]    "

## 2025-05-08 ENCOUNTER — CLINICAL SUPPORT (OUTPATIENT)
Dept: NUTRITION | Facility: CLINIC | Age: 85
End: 2025-05-08
Payer: MEDICARE

## 2025-05-08 VITALS — WEIGHT: 219 LBS | BODY MASS INDEX: 28.89 KG/M2

## 2025-05-08 DIAGNOSIS — E11.9 TYPE 2 DIABETES MELLITUS WITHOUT COMPLICATION, WITHOUT LONG-TERM CURRENT USE OF INSULIN (HCC): Primary | ICD-10-CM

## 2025-05-08 DIAGNOSIS — I10 HTN, GOAL BELOW 130/80: ICD-10-CM

## 2025-05-08 DIAGNOSIS — I50.20 SYSTOLIC HEART FAILURE, UNSPECIFIED HF CHRONICITY (HCC): ICD-10-CM

## 2025-05-08 PROCEDURE — 97802 MEDICAL NUTRITION INDIV IN: CPT

## 2025-05-15 RX ORDER — TRAMADOL HYDROCHLORIDE 50 MG/1
25 TABLET ORAL
COMMUNITY
Start: 2025-03-17 | End: 2025-05-22

## 2025-05-15 RX ORDER — FESOTERODINE FUMARATE 8 MG/1
TABLET, FILM COATED, EXTENDED RELEASE ORAL
COMMUNITY
End: 2025-05-22

## 2025-05-15 RX ORDER — FAMOTIDINE 20 MG/1
TABLET, FILM COATED ORAL
COMMUNITY
Start: 2025-03-17 | End: 2025-05-22

## 2025-05-15 RX ORDER — FUROSEMIDE 40 MG/1
TABLET ORAL
COMMUNITY
Start: 2025-03-17 | End: 2025-05-22

## 2025-05-15 RX ORDER — DOCUSATE SODIUM 100 MG/1
100 CAPSULE, LIQUID FILLED ORAL 2 TIMES DAILY
COMMUNITY
Start: 2025-03-17 | End: 2025-05-22

## 2025-05-15 RX ORDER — POTASSIUM CHLORIDE 1500 MG/1
TABLET, EXTENDED RELEASE ORAL
COMMUNITY
Start: 2025-03-17 | End: 2025-05-22

## 2025-05-19 NOTE — PROGRESS NOTES
Pulmonary Follow Up Note   Joseph Nath 85 y.o. male MRN: [id number]  5/22/2025    Assessment:  COPD  No PFT on file but noted hyperinflated lung, heavy smoking history, emphysematous changes on chest imaging  Last exacerbation in 5/2023  On triple inhalers  Stable, no cough, chest congestion or dyspnea on exertion  No frequent use of PRN DuoNeb    Plan:  Continue Trelegy Ellipta 100  Continue as needed albuterol HFA/DuoNeb  Up-to-date on immunization    Obstructive sleep apnea  Compliance report between 2/15 and 5/15/2025: Usage above 4 hours 24% of the times, 75.6 below 4 hours  Residual AHI 2.2  Already underwent titration study at [facility], will request outside reports  Counseled about need to keep the CPAP on for 4 hours or more  Will close follow-up in 3 to 4 months    Former tobacco abuse  About 50-pack-year quit in 2007  Remains abstinent  No suspicious lesions on last CT chest done in 3/2025  Out of the window for lung cancer screening    Return in about 4 months (around 9/22/2025).    History of Present Illness     Follow up for: COPD        Background  85 y.o. male with a h/o AAA, prostate cancer, osteoarthritis, CKD 3, chronic pain syndrome, DM2, hypothyroidism, generalized anxiety disorder, acid reflux, HTN, class I obesity, osteoporosis, TIA, COPD, RAJI, former tobacco abuse     Hospitalized 5/2024 for COPD with exacerbation.  Presented with viral URI-like symptoms, with acute hypoxic respiratory failure.  Required up to 2 LPM supplemental oxygen.  Treated with IV steroids, weaning nebulized inhalers.  Improved hypoxemia, on room air before discharge did not require oxygen on ambulatory pulse oximetry.     Reports a diagnosis of COPD several years ago via PFT in 2011, has been maintained on Anoro Ellipta since then.  Only 1 hospitalization/steroid use in 5/2024.  No other exacerbations.  Quit smoking 2007.     Reports ongoing daily cough, mainly dry, sometimes with minimal sputum  production.  Does not use PRN albuterol at all.  Still on the Anoro Ellipta.  Able to walk on a surface level however at a slow pace, climb 1 flight of stairs, feels dyspnea at the end of exercise.  Also was with activity such as mowing the grass, or yard work.      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     10/2024-hospitalized to [facility] for PCI/LETY to LAD/LCx, readmitted a day after for acute STEMI V2-V4, noted to have in-stent thrombosis of the LAD, underwent LETY to the thrombosed stent with POBA to the Lcx     11/2024 visit-continue Trelegy Ellipta 100, not interested in de-escalation of therapy.    Interval History  Since last seen, underwent CABG surgery in March at [facility].  No postop respiratory complication, extubated successfully remains 3 days postop and discharged.    Currently enrolled in cardiac rehab.  Denies any respiratory complaints.  Still active/independent, with more exercise capacity than before.    Using CPAP consistently every night however does not keep it on for long, usually does not put it back when he goes to the restroom.    Review of Systems  As per hpi, all other systems reviewed and were negative    Studies:    Imaging and other studies: I have personally reviewed pertinent films in PACS  CT chest 3/28/2025-5 mm nodule at the lingula.  Background emphysema.  Pleural-parenchymal scarring at the apices    Pulmonary function testing:       EKG, Pathology, and Other Studies: I have personally reviewed pertinent reports.        Past medical, surgical, social and family histories reviewed.      Medications/Allergies: Reviewed      Vitals:  "Blood pressure 130/74, pulse 73, temperature 98.2 °F (36.8 °C), temperature source Temporal, height 6' 1\" (1.854 m), weight 100 kg (221 lb), SpO2 96%. Body mass index is 29.16 kg/m². Oxygen Therapy  SpO2: 96 %      Physical Exam  Body mass index is 29.16 kg/m².   Gen: not in acute distress,   Neck/Eyes: supple, PERRL  Ear: normal appearance, no significant hearing impairment  Nose:  normal nasal mucosa, no drainage  Mouth:  unremarkable/normal appearance of lips, teeth and gums  Chest: normal respiratory efforts, clear breath sounds bilaterally  CV: RRR, + healed median sternotomy scar, no murmurs appreciated, mild LE pitting edema  Abdomen: soft, non tender  Extremities:  No observed deformity   Neuro: AAO X3, no focal motor deficit        Labs:  Lab Results   Component Value Date    WBC 11.07 (H) 03/28/2025    HGB 10.8 (L) 03/28/2025    HCT 32.5 (L) 03/28/2025    MCV 89 03/28/2025     (H) 03/28/2025     Lab Results   Component Value Date    CALCIUM 9.9 04/16/2025    K 3.2 (L) 03/28/2025    CO2 29 03/28/2025    CL 98 03/28/2025    BUN 30 (H) 03/28/2025    CREATININE 1.44 (H) 03/28/2025     No results found for: \"IGE\"  Lab Results   Component Value Date    ALT 21 03/28/2025    AST 21 03/28/2025    ALKPHOS 81 03/28/2025           Portions of the record may have been created with voice recognition software.  Occasional wrong word or \"sound a like\" substitutions may have occurred due to the inherent limitations of voice recognition software.  Read the chart carefully and recognize, using context, where substitutions have occurred    Bakari Sparks M.D.  [facility]  Answers submitted by the patient for this visit:  Pulmonology Questionnaire (Submitted on 5/18/2025)  Chief Complaint: Primary symptoms  Chronicity: recurrent  When did you first notice your symptoms?: more than 1 year ago  How often do your symptoms occur?: daily  Since you first noticed this problem, how has it " changed?: unchanged  Do you have shortness of breath that occurs with effort or exertion?: Yes  Do you have ear congestion?: No  Do you have heartburn?: No  Do you have fatigue?: Yes  Do you have nasal congestion?: No  Do you have shortness of breath when lying flat?: No  Do you have shortness of breath when you wake up?: No  Do you have sweats?: No  Have you experienced weight loss?: Yes  Which of the following makes your symptoms worse?: exercise, pollen  Which of the following makes your symptoms better?: OTC cough suppressant

## 2025-05-22 ENCOUNTER — OFFICE VISIT (OUTPATIENT)
Dept: PULMONOLOGY | Facility: CLINIC | Age: 85
End: 2025-05-22

## 2025-05-22 VITALS
SYSTOLIC BLOOD PRESSURE: 130 MMHG | BODY MASS INDEX: 29.29 KG/M2 | OXYGEN SATURATION: 96 % | DIASTOLIC BLOOD PRESSURE: 74 MMHG | TEMPERATURE: 98.2 F | WEIGHT: 221 LBS | HEART RATE: 73 BPM | HEIGHT: 73 IN

## 2025-05-22 DIAGNOSIS — G47.33 OSA (OBSTRUCTIVE SLEEP APNEA): ICD-10-CM

## 2025-05-22 DIAGNOSIS — J44.9 CHRONIC OBSTRUCTIVE PULMONARY DISEASE, UNSPECIFIED COPD TYPE (HCC): Primary | ICD-10-CM

## 2025-06-19 DIAGNOSIS — T14.8XXA OTHER INJURY OF UNSPECIFIED BODY REGION, INITIAL ENCOUNTER: ICD-10-CM

## 2025-06-27 ENCOUNTER — HOSPITAL ENCOUNTER (OUTPATIENT)
Dept: CT IMAGING | Facility: HOSPITAL | Age: 85
End: 2025-06-27
Attending: FAMILY MEDICINE
Payer: MEDICARE

## 2025-06-27 DIAGNOSIS — T14.8XXA OTHER INJURY OF UNSPECIFIED BODY REGION, INITIAL ENCOUNTER: ICD-10-CM

## 2025-06-27 PROCEDURE — 71260 CT THORAX DX C+: CPT

## 2025-06-27 RX ADMIN — IOHEXOL 85 ML: 350 INJECTION, SOLUTION INTRAVENOUS at 15:41

## 2025-07-30 RX ORDER — LEVOTHYROXINE SODIUM 150 UG/1
TABLET ORAL
COMMUNITY
Start: 2025-06-25

## 2025-07-30 RX ORDER — VIT C/E/ZN/COPPR/LUTEIN/ZEAXAN 250MG-90MG
CAPSULE ORAL
COMMUNITY

## 2025-07-30 RX ORDER — TAMSULOSIN HYDROCHLORIDE 0.4 MG/1
0.4 CAPSULE ORAL EVERY MORNING
COMMUNITY
Start: 2025-05-30

## 2025-08-04 ENCOUNTER — PROCEDURE VISIT (OUTPATIENT)
Dept: PODIATRY | Age: 85
End: 2025-08-04
Payer: MEDICARE

## 2025-08-04 VITALS — BODY MASS INDEX: 29.53 KG/M2 | WEIGHT: 222.8 LBS | HEIGHT: 73 IN

## 2025-08-04 DIAGNOSIS — I73.9 PAD (PERIPHERAL ARTERY DISEASE) (HCC): Primary | ICD-10-CM

## 2025-08-04 DIAGNOSIS — B35.1 ONYCHOMYCOSIS: ICD-10-CM

## 2025-08-04 PROCEDURE — 99203 OFFICE O/P NEW LOW 30 MIN: CPT | Performed by: STUDENT IN AN ORGANIZED HEALTH CARE EDUCATION/TRAINING PROGRAM

## 2025-08-04 PROCEDURE — 11721 DEBRIDE NAIL 6 OR MORE: CPT | Performed by: STUDENT IN AN ORGANIZED HEALTH CARE EDUCATION/TRAINING PROGRAM

## 2025-08-04 RX ORDER — BLOOD-GLUCOSE METER
KIT MISCELLANEOUS
COMMUNITY
Start: 2025-07-16

## 2025-08-04 RX ORDER — ATORVASTATIN CALCIUM 40 MG/1
40 TABLET, FILM COATED ORAL EVERY MORNING
COMMUNITY
Start: 2025-07-13

## 2025-08-05 ENCOUNTER — TELEPHONE (OUTPATIENT)
Dept: PODIATRY | Facility: CLINIC | Age: 85
End: 2025-08-05

## 2025-08-06 ENCOUNTER — HOSPITAL ENCOUNTER (OUTPATIENT)
Dept: NON INVASIVE DIAGNOSTICS | Facility: HOSPITAL | Age: 85
Discharge: HOME | End: 2025-08-06
Attending: STUDENT IN AN ORGANIZED HEALTH CARE EDUCATION/TRAINING PROGRAM
Payer: MEDICARE

## 2025-08-06 VITALS
WEIGHT: 222 LBS | DIASTOLIC BLOOD PRESSURE: 74 MMHG | HEIGHT: 73 IN | BODY MASS INDEX: 29.42 KG/M2 | HEART RATE: 74 BPM | SYSTOLIC BLOOD PRESSURE: 130 MMHG

## 2025-08-06 DIAGNOSIS — I50.20 UNSPECIFIED SYSTOLIC (CONGESTIVE) HEART FAILURE (HCC): ICD-10-CM

## 2025-08-06 LAB
AORTIC ROOT: 3.8 CM
ASCENDING AORTA: 3.3 CM
BSA FOR ECHO PROCEDURE: 2.25 M2
E WAVE DECELERATION TIME: 231 MS
E/A RATIO: 0.89
FRACTIONAL SHORTENING: 36 (ref 28–44)
INTERVENTRICULAR SEPTUM IN DIASTOLE (PARASTERNAL SHORT AXIS VIEW): 0.9 CM
INTERVENTRICULAR SEPTUM: 0.9 CM (ref 0.6–1.1)
IVC: 12 MM
LAAS-AP2: 15.1 CM2
LAAS-AP4: 12.7 CM2
LEFT ATRIUM SIZE: 3.9 CM
LEFT ATRIUM VOLUME (MOD BIPLANE): 32 ML
LEFT ATRIUM VOLUME INDEX (MOD BIPLANE): 14.2 ML/M2
LEFT INTERNAL DIMENSION IN SYSTOLE: 3.7 CM (ref 2.1–4)
LEFT VENTRICLE DIASTOLIC VOLUME (MOD BIPLANE): 71 ML
LEFT VENTRICLE DIASTOLIC VOLUME INDEX (MOD BIPLANE): 31.6 ML/M2
LEFT VENTRICLE SYSTOLIC VOLUME (MOD BIPLANE): 31 ML
LEFT VENTRICLE SYSTOLIC VOLUME INDEX (MOD BIPLANE): 13.8 ML/M2
LEFT VENTRICULAR INTERNAL DIMENSION IN DIASTOLE: 5.8 CM (ref 3.5–6)
LEFT VENTRICULAR POSTERIOR WALL IN END DIASTOLE: 0.9 CM
LEFT VENTRICULAR STROKE VOLUME: 110 ML
LV EF BIPLANE MOD: 56 %
LV EF US.2D.A4C+ESTIMATED: 61 %
LVSV (TEICH): 110 ML
MV E'TISSUE VEL-LAT: 10 CM/S
MV E'TISSUE VEL-SEP: 5 CM/S
MV PEAK A VEL: 0.89 M/S
MV PEAK E VEL: 79 CM/S
MV STENOSIS PRESSURE HALF TIME: 67 MS
MV VALVE AREA P 1/2 METHOD: 3.28
RA PRESSURE ESTIMATED: 3 MMHG
RIGHT ATRIUM AREA SYSTOLE A4C: 15 CM2
RIGHT VENTRICLE ID DIMENSION: 3.2 CM
RV PSP: 28 MMHG
SL CV LEFT ATRIUM LENGTH A2C: 4.8 CM
SL CV PED ECHO LEFT VENTRICLE DIASTOLIC VOLUME (MOD BIPLANE) 2D: 168 ML
SL CV PED ECHO LEFT VENTRICLE SYSTOLIC VOLUME (MOD BIPLANE) 2D: 58 ML
TR MAX PG: 25 MMHG
TR PEAK VELOCITY: 2.5 M/S
TRICUSPID VALVE PEAK REGURGITATION VELOCITY: 2.49 M/S

## 2025-08-06 PROCEDURE — 93306 TTE W/DOPPLER COMPLETE: CPT

## (undated) DEVICE — TRAY EPID PERIFIX CUSTOM

## (undated) DEVICE — STERILE LATEX POWDER-FREE SURGICAL GLOVESWITH NITRILE COATING: Brand: PROTEXIS

## (undated) DEVICE — UTILITY MARKER,BLACK WITH LABELS: Brand: DEVON

## (undated) DEVICE — DRAPE SHEET THREE QUARTER

## (undated) DEVICE — CHLORAPREP HI-LITE 10.5ML ORANGE

## (undated) DEVICE — SYRINGE 5ML LL

## (undated) DEVICE — SYRINGE EPI 8ML LUER SLIP LOSS OF RESISTANCE PLASTIC PERFIX

## (undated) DEVICE — NEEDLE SPINAL TUOHY 18GA X 3 1/2

## (undated) DEVICE — PLASTIC ADHESIVE BANDAGE: Brand: CURITY